# Patient Record
Sex: FEMALE | Race: WHITE | Employment: OTHER | URBAN - METROPOLITAN AREA
[De-identification: names, ages, dates, MRNs, and addresses within clinical notes are randomized per-mention and may not be internally consistent; named-entity substitution may affect disease eponyms.]

---

## 2017-05-01 ENCOUNTER — HOSPITAL ENCOUNTER (OUTPATIENT)
Dept: INTERVENTIONAL RADIOLOGY/VASCULAR | Age: 47
Discharge: HOME | End: 2017-05-01
Attending: ORTHOPAEDIC SURGERY | Admitting: ORTHOPAEDIC SURGERY
Payer: COMMERCIAL

## 2017-05-01 VITALS
SYSTOLIC BLOOD PRESSURE: 108 MMHG | BODY MASS INDEX: 25.61 KG/M2 | WEIGHT: 122 LBS | RESPIRATION RATE: 16 BRPM | TEMPERATURE: 98.4 F | OXYGEN SATURATION: 100 % | HEART RATE: 74 BPM | HEIGHT: 58 IN | DIASTOLIC BLOOD PRESSURE: 79 MMHG

## 2017-05-01 DIAGNOSIS — M25.551 RIGHT HIP PAIN: ICD-10-CM

## 2017-05-01 PROCEDURE — 77002 NEEDLE LOCALIZATION BY XRAY: CPT

## 2017-05-01 PROCEDURE — 74011636320 HC RX REV CODE- 636/320

## 2017-05-01 PROCEDURE — 74011000250 HC RX REV CODE- 250: Performed by: RADIOLOGY

## 2017-05-01 PROCEDURE — 74011250636 HC RX REV CODE- 250/636: Performed by: RADIOLOGY

## 2017-05-01 RX ORDER — BUPIVACAINE HYDROCHLORIDE 5 MG/ML
30 INJECTION, SOLUTION EPIDURAL; INTRACAUDAL
Status: COMPLETED | OUTPATIENT
Start: 2017-05-01 | End: 2017-05-01

## 2017-05-01 RX ORDER — TRIAMCINOLONE ACETONIDE 40 MG/ML
40 INJECTION, SUSPENSION INTRA-ARTICULAR; INTRAMUSCULAR
Status: COMPLETED | OUTPATIENT
Start: 2017-05-01 | End: 2017-05-01

## 2017-05-01 RX ADMIN — BUPIVACAINE HYDROCHLORIDE 150 MG: 5 INJECTION, SOLUTION EPIDURAL; INTRACAUDAL at 08:41

## 2017-05-01 RX ADMIN — TRIAMCINOLONE ACETONIDE 40 MG: 40 INJECTION, SUSPENSION INTRA-ARTICULAR; INTRAMUSCULAR at 08:41

## 2017-05-01 RX ADMIN — IOHEXOL 20 ML: 180 INJECTION INTRAVENOUS at 08:41

## 2017-08-15 ENCOUNTER — HOSPITAL ENCOUNTER (OUTPATIENT)
Dept: MAMMOGRAPHY | Age: 47
Discharge: HOME | End: 2017-08-15
Attending: OBSTETRICS & GYNECOLOGY
Payer: COMMERCIAL

## 2017-08-15 DIAGNOSIS — Z12.31 VISIT FOR SCREENING MAMMOGRAM: ICD-10-CM

## 2017-08-15 PROCEDURE — 77067 SCR MAMMO BI INCL CAD: CPT

## 2018-09-25 ENCOUNTER — HOSPITAL ENCOUNTER (OUTPATIENT)
Dept: MAMMOGRAPHY | Age: 48
Discharge: HOME | End: 2018-09-25
Payer: COMMERCIAL

## 2018-09-25 DIAGNOSIS — Z12.31 VISIT FOR SCREENING MAMMOGRAM: ICD-10-CM

## 2018-09-25 PROCEDURE — 77063 BREAST TOMOSYNTHESIS BI: CPT

## 2019-09-27 ENCOUNTER — HOSPITAL ENCOUNTER (OUTPATIENT)
Dept: MAMMOGRAPHY | Age: 49
Discharge: HOME | End: 2019-09-27
Attending: OBSTETRICS & GYNECOLOGY
Payer: COMMERCIAL

## 2019-09-27 DIAGNOSIS — Z12.39 BREAST SCREENING: ICD-10-CM

## 2019-09-27 PROCEDURE — 77063 BREAST TOMOSYNTHESIS BI: CPT

## 2020-09-30 ENCOUNTER — TRANSCRIBE ORDER (OUTPATIENT)
Dept: SCHEDULING | Age: 50
End: 2020-09-30

## 2020-09-30 DIAGNOSIS — Z12.31 ENCOUNTER FOR MAMMOGRAM TO ESTABLISH BASELINE MAMMOGRAM: Primary | ICD-10-CM

## 2020-10-23 ENCOUNTER — HOSPITAL ENCOUNTER (OUTPATIENT)
Dept: MAMMOGRAPHY | Age: 50
Discharge: HOME | End: 2020-10-23
Attending: OBSTETRICS & GYNECOLOGY
Payer: COMMERCIAL

## 2020-10-23 DIAGNOSIS — Z12.31 ENCOUNTER FOR MAMMOGRAM TO ESTABLISH BASELINE MAMMOGRAM: ICD-10-CM

## 2020-10-23 PROCEDURE — 77063 BREAST TOMOSYNTHESIS BI: CPT

## 2020-11-29 ENCOUNTER — HOSPITAL ENCOUNTER (OUTPATIENT)
Dept: PREADMISSION TESTING | Age: 50
Discharge: HOME | End: 2020-11-29
Payer: COMMERCIAL

## 2020-11-29 PROCEDURE — 87635 SARS-COV-2 COVID-19 AMP PRB: CPT

## 2020-11-30 LAB — SARS-COV-2, COV2NT: NOT DETECTED

## 2020-12-03 ENCOUNTER — ANESTHESIA (OUTPATIENT)
Dept: ENDOSCOPY | Age: 50
End: 2020-12-03
Payer: COMMERCIAL

## 2020-12-03 ENCOUNTER — ANESTHESIA EVENT (OUTPATIENT)
Dept: ENDOSCOPY | Age: 50
End: 2020-12-03
Payer: COMMERCIAL

## 2020-12-03 ENCOUNTER — HOSPITAL ENCOUNTER (OUTPATIENT)
Age: 50
Setting detail: OUTPATIENT SURGERY
Discharge: HOME | End: 2020-12-03
Attending: INTERNAL MEDICINE | Admitting: INTERNAL MEDICINE
Payer: COMMERCIAL

## 2020-12-03 VITALS
OXYGEN SATURATION: 100 % | SYSTOLIC BLOOD PRESSURE: 111 MMHG | RESPIRATION RATE: 18 BRPM | TEMPERATURE: 98.4 F | BODY MASS INDEX: 27.29 KG/M2 | HEART RATE: 75 BPM | HEIGHT: 58 IN | WEIGHT: 130 LBS | DIASTOLIC BLOOD PRESSURE: 68 MMHG

## 2020-12-03 PROCEDURE — 76060000032 HC ANESTHESIA 0.5 TO 1 HR: Performed by: INTERNAL MEDICINE

## 2020-12-03 PROCEDURE — 76040000007: Performed by: INTERNAL MEDICINE

## 2020-12-03 PROCEDURE — 74011000250 HC RX REV CODE- 250: Performed by: NURSE ANESTHETIST, CERTIFIED REGISTERED

## 2020-12-03 PROCEDURE — 74011250636 HC RX REV CODE- 250/636: Performed by: NURSE ANESTHETIST, CERTIFIED REGISTERED

## 2020-12-03 PROCEDURE — 74011250637 HC RX REV CODE- 250/637: Performed by: INTERNAL MEDICINE

## 2020-12-03 RX ORDER — DEXTROMETHORPHAN/PSEUDOEPHED 2.5-7.5/.8
1.2 DROPS ORAL
Status: DISCONTINUED | OUTPATIENT
Start: 2020-12-03 | End: 2020-12-03 | Stop reason: HOSPADM

## 2020-12-03 RX ORDER — SODIUM CHLORIDE 0.9 % (FLUSH) 0.9 %
5-40 SYRINGE (ML) INJECTION AS NEEDED
Status: DISCONTINUED | OUTPATIENT
Start: 2020-12-03 | End: 2020-12-03 | Stop reason: HOSPADM

## 2020-12-03 RX ORDER — NALOXONE HYDROCHLORIDE 0.4 MG/ML
0.4 INJECTION, SOLUTION INTRAMUSCULAR; INTRAVENOUS; SUBCUTANEOUS
Status: DISCONTINUED | OUTPATIENT
Start: 2020-12-03 | End: 2020-12-03 | Stop reason: HOSPADM

## 2020-12-03 RX ORDER — SODIUM CHLORIDE 9 MG/ML
INJECTION, SOLUTION INTRAVENOUS
Status: DISCONTINUED | OUTPATIENT
Start: 2020-12-03 | End: 2020-12-03 | Stop reason: HOSPADM

## 2020-12-03 RX ORDER — ATROPINE SULFATE 0.1 MG/ML
0.5 INJECTION INTRAVENOUS
Status: DISCONTINUED | OUTPATIENT
Start: 2020-12-03 | End: 2020-12-03 | Stop reason: HOSPADM

## 2020-12-03 RX ORDER — SODIUM CHLORIDE 0.9 % (FLUSH) 0.9 %
5-40 SYRINGE (ML) INJECTION EVERY 8 HOURS
Status: DISCONTINUED | OUTPATIENT
Start: 2020-12-03 | End: 2020-12-03 | Stop reason: HOSPADM

## 2020-12-03 RX ORDER — EPINEPHRINE 0.1 MG/ML
1 INJECTION INTRACARDIAC; INTRAVENOUS
Status: DISCONTINUED | OUTPATIENT
Start: 2020-12-03 | End: 2020-12-03 | Stop reason: HOSPADM

## 2020-12-03 RX ORDER — ONDANSETRON 2 MG/ML
INJECTION INTRAMUSCULAR; INTRAVENOUS AS NEEDED
Status: DISCONTINUED | OUTPATIENT
Start: 2020-12-03 | End: 2020-12-03 | Stop reason: HOSPADM

## 2020-12-03 RX ORDER — PROPOFOL 10 MG/ML
INJECTION, EMULSION INTRAVENOUS AS NEEDED
Status: DISCONTINUED | OUTPATIENT
Start: 2020-12-03 | End: 2020-12-03 | Stop reason: HOSPADM

## 2020-12-03 RX ORDER — SODIUM CHLORIDE 9 MG/ML
50 INJECTION, SOLUTION INTRAVENOUS CONTINUOUS
Status: DISCONTINUED | OUTPATIENT
Start: 2020-12-03 | End: 2020-12-03 | Stop reason: HOSPADM

## 2020-12-03 RX ORDER — LIDOCAINE HYDROCHLORIDE 20 MG/ML
INJECTION, SOLUTION EPIDURAL; INFILTRATION; INTRACAUDAL; PERINEURAL AS NEEDED
Status: DISCONTINUED | OUTPATIENT
Start: 2020-12-03 | End: 2020-12-03 | Stop reason: HOSPADM

## 2020-12-03 RX ORDER — FLUMAZENIL 0.1 MG/ML
0.2 INJECTION INTRAVENOUS
Status: DISCONTINUED | OUTPATIENT
Start: 2020-12-03 | End: 2020-12-03 | Stop reason: HOSPADM

## 2020-12-03 RX ORDER — MIDAZOLAM HYDROCHLORIDE 1 MG/ML
.25-5 INJECTION, SOLUTION INTRAMUSCULAR; INTRAVENOUS
Status: DISCONTINUED | OUTPATIENT
Start: 2020-12-03 | End: 2020-12-03 | Stop reason: HOSPADM

## 2020-12-03 RX ORDER — FENTANYL CITRATE 50 UG/ML
25-200 INJECTION, SOLUTION INTRAMUSCULAR; INTRAVENOUS
Status: DISCONTINUED | OUTPATIENT
Start: 2020-12-03 | End: 2020-12-03 | Stop reason: HOSPADM

## 2020-12-03 RX ADMIN — PROPOFOL 50 MG: 10 INJECTION, EMULSION INTRAVENOUS at 08:48

## 2020-12-03 RX ADMIN — PROPOFOL 50 MG: 10 INJECTION, EMULSION INTRAVENOUS at 08:52

## 2020-12-03 RX ADMIN — PROPOFOL 50 MG: 10 INJECTION, EMULSION INTRAVENOUS at 08:49

## 2020-12-03 RX ADMIN — LIDOCAINE HYDROCHLORIDE 40 MG: 20 INJECTION, SOLUTION EPIDURAL; INFILTRATION; INTRACAUDAL; PERINEURAL at 08:47

## 2020-12-03 RX ADMIN — PROPOFOL 50 MG: 10 INJECTION, EMULSION INTRAVENOUS at 08:55

## 2020-12-03 RX ADMIN — PROPOFOL 50 MG: 10 INJECTION, EMULSION INTRAVENOUS at 08:47

## 2020-12-03 RX ADMIN — SODIUM CHLORIDE: 900 INJECTION, SOLUTION INTRAVENOUS at 08:38

## 2020-12-03 RX ADMIN — ONDANSETRON HYDROCHLORIDE 4 MG: 2 INJECTION, SOLUTION INTRAMUSCULAR; INTRAVENOUS at 08:49

## 2020-12-03 RX ADMIN — PROPOFOL 50 MG: 10 INJECTION, EMULSION INTRAVENOUS at 08:58

## 2020-12-03 RX ADMIN — PROPOFOL 50 MG: 10 INJECTION, EMULSION INTRAVENOUS at 09:03

## 2020-12-03 RX ADMIN — PROPOFOL 50 MG: 10 INJECTION, EMULSION INTRAVENOUS at 09:10

## 2020-12-03 RX ADMIN — PROPOFOL 50 MG: 10 INJECTION, EMULSION INTRAVENOUS at 09:07

## 2020-12-03 NOTE — H&P
[address]  174 [facility], [address] [place], [place] [address]      History and Physical       NAME:  Meng Handler   :   1970   MRN:   [id number]             History of Present Illness:  Patient is a 45 y.o. who is seen for history of colon polyps. Last colonoscopy was in  and a hyperplastic polyp was removed. PMH:  Past Medical History:   Diagnosis Date    Heart murmur     CONGENITAL    Hypercholesterolemia     Nausea & vomiting     S/P colonoscopy 2010- hyperplastic rectal polyp       PSH:  Past Surgical History:   Procedure Laterality Date    BREAST SURGERY PROCEDURE UNLISTED      LEFT BREAST BIOPSY    HX BREAST BIOPSY Left     age 38; BENIGN    HX HEENT      ear tubes x 4    HX ORTHOPAEDIC  2015    RT FINGER REPAIR FROM DOG BITE    HX OTHER SURGICAL      Urethral sling for bladder incontinence       Allergies: Allergies   Allergen Reactions    Percocet [Oxycodone-Acetaminophen] Nausea and Vomiting    Percodan [Oxycodone Hcl-Oxycodone-Asa] Nausea and Vomiting    Sulfa (Sulfonamide Antibiotics) Hives       Home Medications:  Prior to Admission Medications   Prescriptions Last Dose Informant Patient Reported? Taking? cetirizine (ZYRTEC) 10 mg tablet Unknown at Unknown time  Yes No   Sig: Take 10 mg by mouth as needed for Allergies. fluticasone (FLONASE) 50 mcg/actuation nasal spray Unknown at Unknown time  Yes No   Si Sprays by Both Nostrils route daily. levonorgestrel (MIRENA) 20 mcg/24 hour (5 years) IUD   Yes No   Si Each by IntraUTERine route once. naproxen sodium 220 mg cap Unknown at Unknown time  Yes No   Sig: Take 220 mg by mouth as needed.       Facility-Administered Medications: None       Hospital Medications:  Current Facility-Administered Medications   Medication Dose Route Frequency    0.9% sodium chloride infusion  50 mL/hr IntraVENous CONTINUOUS    sodium chloride (NS) flush 5-40 mL  5-40 mL IntraVENous Q8H    sodium chloride (NS) flush 5-40 mL  5-40 mL IntraVENous PRN    midazolam (VERSED) injection 0.25-5 mg  0.25-5 mg IntraVENous Multiple    fentaNYL citrate (PF) injection  mcg   mcg IntraVENous Multiple    naloxone (NARCAN) injection 0.4 mg  0.4 mg IntraVENous Multiple    flumazeniL (ROMAZICON) 0.1 mg/mL injection 0.2 mg  0.2 mg IntraVENous Multiple    simethicone (MYLICON) 81YY/9.1FR oral drops 80 mg  1.2 mL Oral Multiple    atropine injection 0.5 mg  0.5 mg IntraVENous ONCE PRN    EPINEPHrine (ADRENALIN) 0.1 mg/mL syringe 1 mg  1 mg Endoscopically ONCE PRN       Social History:  Social History     Tobacco Use    Smoking status: Never Smoker    Smokeless tobacco: Never Used   Substance Use Topics    Alcohol use:  Yes     Alcohol/week: 5.0 standard drinks     Types: 5 Glasses of wine per week       Family History:  Family History   Problem Relation Age of Onset    Cancer Mother         small cell lung cancer    Other Mother         Graves disease    Hypertension Mother     Coronary Artery Disease Father         stents    High Cholesterol Father     Heart Surgery Father 39        CABG x3    Hypertension Father     Depression Father     Breast Cancer Paternal Aunt     Anesth Problems Neg Hx              Review of Systems:      Constitutional: negative fever, negative chills, negative weight loss  Eyes:   negative visual changes  ENT:   negative sore throat, tongue or lip swelling  Respiratory:  negative cough, negative dyspnea  Cards:  negative for chest pain, palpitations, lower extremity edema  GI:   See HPI  :  negative for frequency, dysuria  Integument:  negative for rash and pruritus  Heme:  negative for easy bruising and gum/nose bleeding  Musculoskel: negative for myalgias,  back pain and muscle weakness  Neuro: negative for headaches, dizziness, vertigo  Psych:  negative for feelings of anxiety, depression       Objective:     Patient Vitals for the past 8 hrs:   BP Temp Pulse Resp SpO2 Height Weight   12/03/20 0813 120/89 98.2 °F (36.8 °C) 80 18 100 % -- --   12/03/20 0806 -- -- -- -- -- 4' 10\" (1.473 m) 59 kg (130 lb)     No intake/output data recorded. No intake/output data recorded. EXAM:     NEURO-a&o   HEENT-wnl   LUNGS-clear    COR-regular rate and rhythym     ABD-soft , no tenderness, no rebound, good bs     EXT-no edema     Data Review     No results for input(s): WBC, HGB, HCT, PLT, HGBEXT, HCTEXT, PLTEXT in the last 72 hours. No results for input(s): NA, K, CL, CO2, BUN, CREA, GLU, PHOS, CA in the last 72 hours. No results for input(s): AP, TBIL, TP, ALB, GLOB, GGT, AML, LPSE in the last 72 hours. No lab exists for component: SGOT, GPT, AMYP, HLPSE  No results for input(s): INR, PTP, APTT, INREXT in the last 72 hours. Assessment:     · History of colon polyps     Patient Active Problem List   Diagnosis Code    Chest pain R07.9    Dyslipidemia E78.5    Murmur R01.1     Plan:   · The patient was counseled at length about the risks of ronald Covid-19 in the romelia-operative and post-operative states including the recovery window of their procedure. The patient was made aware that ronald Covid-19 after a surgical procedure may worsen their prognosis for recovering from the virus and lend to a higher morbidity and or mortality risk. The patient was given the options of postponing their procedure. All of the risks, benefits, and alternatives were discussed. The patient does wish to proceed with the procedure. · Endoscopic procedure with MAC     Signed By: Jenna River.  Arlene Mcdaniels MD     12/3/2020  8:40 AM

## 2020-12-03 NOTE — ANESTHESIA PREPROCEDURE EVALUATION
Relevant Problems   No relevant active problems       Anesthetic History     PONV          Review of Systems / Medical History  Patient summary reviewed, nursing notes reviewed and pertinent labs reviewed    Pulmonary  Within defined limits                 Neuro/Psych   Within defined limits           Cardiovascular              Hyperlipidemia         GI/Hepatic/Renal  Within defined limits              Endo/Other  Within defined limits           Other Findings              Physical Exam    Airway  Mallampati: II  TM Distance: > 6 cm  Neck ROM: normal range of motion   Mouth opening: Normal     Cardiovascular  Regular rate and rhythm,  S1 and S2 normal,  no murmur, click, rub, or gallop             Dental  No notable dental hx       Pulmonary  Breath sounds clear to auscultation               Abdominal  GI exam deferred       Other Findings            Anesthetic Plan    ASA: 1  Anesthesia type: MAC            Anesthetic plan and risks discussed with: Patient

## 2020-12-03 NOTE — ANESTHESIA POSTPROCEDURE EVALUATION
Post-Anesthesia Evaluation and Assessment    Patient: Ross Coffman MRN: [id number]  SSN: [id number]    YOB: 1970  Age: 45 y.o. Sex: female      I have evaluated the patient and they are stable and ready for discharge from the PACU. Cardiovascular Function/Vital Signs  Visit Vitals  /65   Pulse 80   Temp 36.8 °C (98.2 °F)   Resp 16   Ht 4' 10\" (1.473 m)   Wt 59 kg (130 lb)   SpO2 100%   BMI 27.17 kg/m²       Patient is status post MAC anesthesia for Procedure(s):  . COLONOSCOPY   :-.    Nausea/Vomiting: None    Postoperative hydration reviewed and adequate. Pain:  Pain Scale 1: Numeric (0 - 10) (12/03/20 0883)  Pain Intensity 1: 6 (12/03/20 5262)   Managed    Neurological Status: At baseline    Mental Status, Level of Consciousness: Alert and  oriented to person, place, and time    Pulmonary Status:   O2 Device: CO2 nasal cannula (12/03/20 0912)   Adequate oxygenation and airway patent    Complications related to anesthesia: None    Post-anesthesia assessment completed. No concerns    Signed By: Tata Cohn MD     December 3, 2020              Procedure(s):  . COLONOSCOPY   :-.    MAC    <BSHSIANPOST>    INITIAL Post-op Vital signs:   Vitals Value Taken Time   BP 94/40 12/3/2020  9:17 AM   Temp     Pulse 83 12/3/2020  9:19 AM   Resp 21 12/3/2020  9:19 AM   SpO2 98 % 12/3/2020  9:19 AM   Vitals shown include unvalidated device data.

## 2020-12-03 NOTE — ROUTINE PROCESS
Moraima Lindsay  1970  [id number]    Situation:  Verbal report received from: WILLEM [place]  Procedure: Procedure(s):  . COLONOSCOPY   :-    Background:    Preoperative diagnosis: HX OF POLYPS  Postoperative diagnosis: 1. Internal Hemorrhoids    :  Dr. Alana Valadez  Assistant(s): Endoscopy Technician-1: Minesh Ortiz  Endoscopy RN-1: Meghana Acevedo RN    Specimens: * No specimens in log *  H. Pylori  no    Assessment:  Intra-procedure medications       Anesthesia gave intra-procedure sedation and medications, see anesthesia flow sheet yes    Intravenous fluids: 800  NS @ KVO     Vital signs stable yes    Abdominal assessment: round and soft yes    Recommendation:  Discharge patient per MD order yes.   Return to floor no  Family or Friend :   Permission to share finding with family or friend yes

## 2020-12-03 NOTE — DISCHARGE INSTRUCTIONS
[address]  1500 [facility], [address] [place] [address]    COLON DISCHARGE INSTRUCTIONS    Ambrosio Gonzalez  [id number]  1970    Discomfort:  Redness at IV site- apply warm compress to area; if redness or soreness persist- contact your physician  There may be a slight amount of blood passed from the rectum  Gaseous discomfort- walking, belching will help relieve any discomfort  You may not operate a vehicle for 12 hours  You may not engage in an occupation involving machinery or appliances for rest of today  You may not drink alcoholic beverages for at least 12 hours  Avoid making any critical decisions for at least 24 hour  DIET:  You may resume your regular diet - however -  remember your colon is empty and a heavy meal will produce gas. Avoid these foods:  vegetables, fried / greasy foods, carbonated drinks     ACTIVITY:  You may  resume your normal daily activities it is recommended that you spend the remainder of the day resting -  avoid any strenuous activity. CALL M.D. ANY SIGN OF:   Increasing pain, nausea, vomiting  Abdominal distension (swelling)  New increased bleeding (oral or rectal)  Fever (chills)  Pain in chest area  Bloody discharge from nose or mouth  Shortness of breath      Follow-up Instructions:   Call Dr. Eleanor Bhatt for any questions or problems at [address]:   Your colonoscopy showed small internal hemorrhoids and was otherwise normal. Your next colonoscopy will be due in 10 years. Please maintain a high fiber diet. Telephone # [phone]      Signed By: Kavin Dakins.  Cecil Casey MD     12/3/2020  9:18 AM       DISCHARGE SUMMARY from Nurse    The following personal items collected during your admission are returned to you:   Dental Appliance: Dental Appliances: None  Vision: Visual Aid: Glasses  Hearing Aid:    Jewelry:    Clothing:    Other Valuables:    Valuables sent to safe:      Learning About Coronavirus (COVID-19)  Coronavirus (COVID-19): Overview  What is coronavirus (DBOWS-06)? The coronavirus disease (COVID-19) is caused by a virus. It is an illness that was first found in [place], [place], in December 2019. It has since spread worldwide. The virus can cause fever, cough, and trouble breathing. In severe cases, it can cause pneumonia and make it hard to breathe without help. It can cause death. Coronaviruses are a large group of viruses. They cause the common cold. They also cause more serious illnesses like Middle East respiratory syndrome (MERS) and severe acute respiratory syndrome (SARS). COVID-19 is caused by a novel coronavirus. That means it's a new type that has not been seen in people before. This virus spreads person-to-person through droplets from coughing and sneezing. It can also spread when you are close to someone who is infected. And it can spread when you touch something that has the virus on it, such as a doorknob or a tabletop. What can you do to protect yourself from coronavirus (COVID-19)? The best way to protect yourself from getting sick is to:  · Avoid areas where there is an outbreak. · Avoid contact with people who may be infected. · Wash your hands often with soap or alcohol-based hand sanitizers. · Avoid crowds and try to stay at least 6 feet away from other people. · Wash your hands often, especially after you cough or sneeze. Use soap and water, and scrub for at least 20 seconds. If soap and water aren't available, use an alcohol-based hand . · Avoid touching your mouth, nose, and eyes. What can you do to avoid spreading the virus to others? To help avoid spreading the virus to others:  · Cover your mouth with a tissue when you cough or sneeze. Then throw the tissue in the trash. · Use a disinfectant to clean things that you touch often. · Stay home if you are sick or have been exposed to the virus. Don't go to school, work, or public areas. And don't use public transportation.   · If you are sick:  ? Leave your home only if you need to get medical care. But call the doctor's office first so they know you're coming. And wear a face mask, if you have one.  ? If you have a face mask, wear it whenever you're around other people. It can help stop the spread of the virus when you cough or sneeze. ? Clean and disinfect your home every day. Use household  and disinfectant wipes or sprays. Take special care to clean things that you grab with your hands. These include doorknobs, remote controls, phones, and handles on your refrigerator and microwave. And don't forget countertops, tabletops, bathrooms, and computer keyboards. When to call for help  Call 911 anytime you think you may need emergency care. For example, call if:  · You have severe trouble breathing. (You can't talk at all.)  · You have constant chest pain or pressure. · You are severely dizzy or lightheaded. · You are confused or can't think clearly. · Your face and lips have a blue color. · You pass out (lose consciousness) or are very hard to wake up. Call your doctor now if you develop symptoms such as:  · Shortness of breath. · Fever. · Cough. If you need to get care, call ahead to the doctor's office for instructions before you go. Make sure you wear a face mask, if you have one, to prevent exposing other people to the virus. Where can you get the latest information? The following health organizations are tracking and studying this virus. Their websites contain the most up-to-date information. Chris Valentine also learn what to do if you think you may have been exposed to the virus. · U.S. Centers for Disease Control and Prevention (CDC): The CDC provides updated news about the disease and travel advice. The website also tells you how to prevent the spread of infection. www.cdc.gov  · World Health Organization [facility]): WHO offers information about the virus outbreaks.  WHO also has travel advice. www.who.int  Current as of: April 1, 2020               Content Version: 12.4  © 3835-3409 [employer]. Care instructions adapted under license by your healthcare professional. If you have questions about a medical condition or this instruction, always ask your healthcare professional. [address] any warranty or liability for your use of this information.

## 2020-12-03 NOTE — PROGRESS NOTES

## 2020-12-03 NOTE — PROCEDURES
[address]  [address]      Colonoscopy Operative Report    Lashell Nichols  [id number]  1970      Procedure Type:   Colonoscopy --screening     Indications:    Screening colonoscopy         Pre-operative Diagnosis: see indication above    Post-operative Diagnosis:  See findings below    :  Malik Santos MD      Referring Provider: Other, MD Christiana      Sedation:  MAC anesthesia Propofol      Procedure Details:  After informed consent was obtained with all risks and benefits of procedure explained and preoperative exam completed, the patient was taken to the endoscopy suite and placed in the left lateral decubitus position. Upon sequential sedation as per above, a digital rectal exam was performed demonstrating internal hemorrhoids. The Olympus pediatric videocolonoscope  was inserted in the rectum and carefully advanced to the sigmoid colon at which point a fixed, angulated turn was encountered and could not be traversed. The ultraslim pediatric colonoscope was used and traversed this region. The cecum was identified by the ileocecal valve and appendiceal orifice. The quality of preparation was excellent. The colonoscope was slowly withdrawn with careful evaluation between folds. Retroflexion in the rectum was completed . Findings:   Rectum: normal  Sigmoid: normal  Descending Colon: normal  Transverse Colon: normal  Ascending Colon: normal  Cecum: normal  Terminal Ileum: normal      Specimen Removed:  none    Complications: None. EBL:  None. Impression:    normal colonic mucosa throughout  hemorrhoids internal, Small in size    Recommendations:    - For colon cancer screening in this average-risk patient, colonoscopy may be repeated in 10 years. - High fiber diet. Signed By: Malik Santos MD     12/3/2020  9:19 AM

## 2020-12-03 NOTE — PROGRESS NOTES
D/C instructions completed with pt at bedside, MD at bedside to speak with pt. IV removed,  coming from main OR to  pt.

## 2021-09-16 ENCOUNTER — TRANSCRIBE ORDER (OUTPATIENT)
Dept: SCHEDULING | Age: 51
End: 2021-09-16

## 2021-09-16 DIAGNOSIS — Z12.31 SCREENING MAMMOGRAM, ENCOUNTER FOR: Primary | ICD-10-CM

## 2021-10-25 ENCOUNTER — HOSPITAL ENCOUNTER (OUTPATIENT)
Dept: MAMMOGRAPHY | Age: 51
Discharge: HOME | End: 2021-10-25
Attending: OBSTETRICS & GYNECOLOGY
Payer: COMMERCIAL

## 2021-10-25 DIAGNOSIS — Z12.31 SCREENING MAMMOGRAM, ENCOUNTER FOR: ICD-10-CM

## 2021-10-25 PROCEDURE — 77063 BREAST TOMOSYNTHESIS BI: CPT

## 2022-07-17 ENCOUNTER — APPOINTMENT (OUTPATIENT)
Dept: GENERAL RADIOLOGY | Age: 52
End: 2022-07-17
Attending: EMERGENCY MEDICINE
Payer: COMMERCIAL

## 2022-07-17 ENCOUNTER — APPOINTMENT (OUTPATIENT)
Dept: GENERAL RADIOLOGY | Age: 52
End: 2022-07-17
Attending: STUDENT IN AN ORGANIZED HEALTH CARE EDUCATION/TRAINING PROGRAM
Payer: COMMERCIAL

## 2022-07-17 ENCOUNTER — HOSPITAL ENCOUNTER (EMERGENCY)
Age: 52
Discharge: HOME | End: 2022-07-17
Attending: EMERGENCY MEDICINE | Admitting: STUDENT IN AN ORGANIZED HEALTH CARE EDUCATION/TRAINING PROGRAM
Payer: COMMERCIAL

## 2022-07-17 VITALS
HEART RATE: 85 BPM | SYSTOLIC BLOOD PRESSURE: 137 MMHG | HEIGHT: 58 IN | BODY MASS INDEX: 30.4 KG/M2 | OXYGEN SATURATION: 96 % | RESPIRATION RATE: 18 BRPM | DIASTOLIC BLOOD PRESSURE: 71 MMHG | TEMPERATURE: 98.6 F | WEIGHT: 144.84 LBS

## 2022-07-17 DIAGNOSIS — S52.602B TYPE I OR II OPEN FRACTURE OF DISTAL END OF LEFT ULNA, UNSPECIFIED FRACTURE MORPHOLOGY, INITIAL ENCOUNTER: ICD-10-CM

## 2022-07-17 DIAGNOSIS — S52.502B TYPE I OR II OPEN FRACTURE OF DISTAL END OF LEFT RADIUS, UNSPECIFIED FRACTURE MORPHOLOGY, INITIAL ENCOUNTER: Primary | ICD-10-CM

## 2022-07-17 PROCEDURE — 96374 THER/PROPH/DIAG INJ IV PUSH: CPT

## 2022-07-17 PROCEDURE — 73110 X-RAY EXAM OF WRIST: CPT

## 2022-07-17 PROCEDURE — 74011000250 HC RX REV CODE- 250: Performed by: STUDENT IN AN ORGANIZED HEALTH CARE EDUCATION/TRAINING PROGRAM

## 2022-07-17 PROCEDURE — 74011250636 HC RX REV CODE- 250/636: Performed by: EMERGENCY MEDICINE

## 2022-07-17 PROCEDURE — 74011250636 HC RX REV CODE- 250/636: Performed by: STUDENT IN AN ORGANIZED HEALTH CARE EDUCATION/TRAINING PROGRAM

## 2022-07-17 PROCEDURE — 74011250637 HC RX REV CODE- 250/637: Performed by: STUDENT IN AN ORGANIZED HEALTH CARE EDUCATION/TRAINING PROGRAM

## 2022-07-17 PROCEDURE — 75810000302 HC ER LEVEL 2 CLOSED TREATMNT FRACTURE/DISLOCATION

## 2022-07-17 PROCEDURE — 96375 TX/PRO/DX INJ NEW DRUG ADDON: CPT

## 2022-07-17 PROCEDURE — 96372 THER/PROPH/DIAG INJ SC/IM: CPT

## 2022-07-17 PROCEDURE — 99285 EMERGENCY DEPT VISIT HI MDM: CPT

## 2022-07-17 RX ORDER — CEPHALEXIN 500 MG/1
500 CAPSULE ORAL 4 TIMES DAILY
Qty: 28 CAPSULE | Refills: 0 | Status: SHIPPED | OUTPATIENT
Start: 2022-07-17 | End: 2022-07-24

## 2022-07-17 RX ORDER — PROPOFOL 10 MG/ML
100 INJECTION, EMULSION INTRAVENOUS
Status: COMPLETED | OUTPATIENT
Start: 2022-07-17 | End: 2022-07-17

## 2022-07-17 RX ORDER — CEPHALEXIN 500 MG/1
500 CAPSULE ORAL
Status: COMPLETED | OUTPATIENT
Start: 2022-07-17 | End: 2022-07-17

## 2022-07-17 RX ORDER — ACETAMINOPHEN AND CODEINE PHOSPHATE 300; 30 MG/1; MG/1
1 TABLET ORAL
Qty: 16 TABLET | Refills: 0 | Status: SHIPPED | OUTPATIENT
Start: 2022-07-17 | End: 2022-07-21

## 2022-07-17 RX ORDER — CEPHALEXIN 500 MG/1
500 CAPSULE ORAL 4 TIMES DAILY
Qty: 28 CAPSULE | Refills: 0 | Status: SHIPPED | OUTPATIENT
Start: 2022-07-17 | End: 2022-07-17 | Stop reason: SDUPTHER

## 2022-07-17 RX ORDER — HYDROMORPHONE HYDROCHLORIDE 1 MG/ML
1 INJECTION, SOLUTION INTRAMUSCULAR; INTRAVENOUS; SUBCUTANEOUS ONCE
Status: COMPLETED | OUTPATIENT
Start: 2022-07-17 | End: 2022-07-17

## 2022-07-17 RX ORDER — ONDANSETRON 2 MG/ML
4 INJECTION INTRAMUSCULAR; INTRAVENOUS ONCE
Status: COMPLETED | OUTPATIENT
Start: 2022-07-17 | End: 2022-07-17

## 2022-07-17 RX ADMIN — PROPOFOL 100 MG: 10 INJECTION, EMULSION INTRAVENOUS at 17:32

## 2022-07-17 RX ADMIN — WATER 1 G: 1 INJECTION INTRAMUSCULAR; INTRAVENOUS; SUBCUTANEOUS at 16:37

## 2022-07-17 RX ADMIN — HYDROMORPHONE HYDROCHLORIDE 1 MG: 1 INJECTION, SOLUTION INTRAMUSCULAR; INTRAVENOUS; SUBCUTANEOUS at 16:27

## 2022-07-17 RX ADMIN — ONDANSETRON 4 MG: 2 INJECTION INTRAMUSCULAR; INTRAVENOUS at 17:40

## 2022-07-17 RX ADMIN — CEPHALEXIN 500 MG: 500 CAPSULE ORAL at 19:22

## 2022-07-17 NOTE — ED NOTES
1755 80 mg propofol IVP administered by Dr. Lisette Becker. 1800 20 mg additional IVP propofol administered by Dr. Lisette Becker. Dr. Radu Giordano cleaned wound and dressed site. 1804 Sedation end. Dr. Radu Giordano, orthopedic MD, completed setting splint.

## 2022-07-17 NOTE — ED TRIAGE NOTES
Pt c/o LEFT wrist injury and deformity after falling from step stool.   Obvious deformity noted on arrival.

## 2022-07-17 NOTE — CONSULTS
ORTHO CONSULT NOTE    Subjective:     Date of Consultation:  July 17, 2022  Referring Physician: Dr. Aly Contreras is a 47 y.o. female who is being seen for left wrist fracture. Injury occurred just prior to arrival to the ER. She fell at her home outside while washing her car. She noticed bleeding and deformity of the left wrist. Xrays in the ER today demonstrate fracture dislocation of the left distal radius with associated distal ulnar fracture. She denies head trauma/LOC during injury. She denies any numbness/tingling in the hand. No other reported injuries. She is otherwise healthy and works locally as a geriatric NP. Her  works here at [address] [place] [address] [place] in the [address] [place] [address]. Orthopedic surgery has been consulted by Dr. Pham Dillard for evaluation and management of this left wrist injury. Patient Active Problem List    Diagnosis Date Noted    Type I or II open fracture of distal end of both radius and ulna of left upper extremity 07/17/2022    Chest pain 09/04/2014    Dyslipidemia 09/04/2014    Murmur 09/04/2014     Family History   Problem Relation Age of Onset    Cancer Mother         small cell lung cancer    Other Mother         Graves disease    Hypertension Mother     Coronary Art Dis Father         stents    High Cholesterol Father     Heart Surgery Father 39        CABG x3    Hypertension Father     Depression Father     Breast Cancer Paternal Aunt     Anesth Problems Neg Hx       Social History     Tobacco Use    Smoking status: Never Smoker    Smokeless tobacco: Never Used   Substance Use Topics    Alcohol use: Yes     Alcohol/week: 5.0 standard drinks     Types: 5 Glasses of wine per week     Past Medical History:   Diagnosis Date    Heart murmur     CONGENITAL    Hypercholesterolemia     Nausea & vomiting     S/P colonoscopy 2010,2020 2010- hyperplastic rectal polyp      Past Surgical History:   Procedure Laterality Date    COLONOSCOPY N/A 12/3/2020    . COLONOSCOPY   :- [facility] by Talmage Hashimoto., [facility] at [facility]OSCOPY    HX BREAST BIOPSY Left     age 38; BENIGN    HX HEENT      ear tubes x 4    HX ORTHOPAEDIC  12/2015    RT FINGER REPAIR FROM DOG BITE    HX OTHER SURGICAL      Urethral sling for bladder incontinence    [place] BREAST SURGERY PROCEDURE UNLISTED  2005    LEFT BREAST BIOPSY      Prior to Admission medications    Medication Sig Start Date End Date Taking? Authorizing Provider   cephALEXin (Keflex) 500 mg capsule Take 1 Capsule by mouth four (4) times daily for 7 days. 7/17/22 7/24/22 Yes Bebo Lisa MD   acetaminophen-codeine (Tylenol-Codeine #3) 300-30 mg per tablet Take 1 Tablet by mouth every six (6) hours as needed for Pain for up to 4 days. Max Daily Amount: 4 Tablets. 7/17/22 7/21/22 Yes Bebo Lisa MD   fluticasone (FLONASE) 50 mcg/actuation nasal spray 2 Sprays by Both Nostrils route daily. Provider, Historical   naproxen sodium 220 mg cap Take 220 mg by mouth as needed. Provider, Historical   cetirizine (ZYRTEC) 10 mg tablet Take 10 mg by mouth as needed for Allergies. Provider, Historical   levonorgestrel (MIRENA) 20 mcg/24 hour (5 years) IUD 1 Each by IntraUTERine route once. Provider, Historical     No current facility-administered medications for this encounter. Current Outpatient Medications   Medication Sig    cephALEXin (Keflex) 500 mg capsule Take 1 Capsule by mouth four (4) times daily for 7 days.  acetaminophen-codeine (Tylenol-Codeine #3) 300-30 mg per tablet Take 1 Tablet by mouth every six (6) hours as needed for Pain for up to 4 days. Max Daily Amount: 4 Tablets.  fluticasone (FLONASE) 50 mcg/actuation nasal spray 2 Sprays by Both Nostrils route daily.  naproxen sodium 220 mg cap Take 220 mg by mouth as needed.  cetirizine (ZYRTEC) 10 mg tablet Take 10 mg by mouth as needed for Allergies.  levonorgestrel (MIRENA) 20 mcg/24 hour (5 years) IUD 1 Each by IntraUTERine route once.       Allergies Allergen Reactions    Percocet [Oxycodone-Acetaminophen] Nausea and Vomiting    Percodan [Oxycodone Hcl-Oxycodone-Asa] Nausea and Vomiting    Sulfa (Sulfonamide Antibiotics) Hives        Review of Systems:  A comprehensive review of systems was negative except for that written in the HPI. Objective:     Patient Vitals for the past 8 hrs:   BP Temp Pulse Resp SpO2 Height Weight   22 1859 -- -- 85 18 96 % -- --   22 1845 137/71 98.6 °F (37 °C) 76 19 97 % -- --   22 1830 (!) 151/72 -- 83 14 -- -- --   22 1815 (!) 153/85 -- 95 27 -- -- --   22 1805 (!) 140/86 98.2 °F (36.8 °C) 88 16 98 % -- --   22 1800 (!) 158/92 -- 78 24 98 % -- --   22 1756 135/74 -- 78 12 94 % -- --   22 1755 135/74 -- 80 21 95 % -- --   22 1750 (!) 147/76 -- 91 18 96 % -- --   22 1736 (!) 140/79 -- 86 20 93 % -- --   22 1705 (!) 147/80 98.6 °F (37 °C) 90 18 98 % -- --   22 1704 -- -- -- -- -- 4' 10\" (1.473 m) 65.7 kg (144 lb 13.5 oz)   22 1515 (!) 146/70 98 °F (36.7 °C) (!) 106 20 100 % -- --     Temp (24hrs), Av.4 °F (36.9 °C), Min:98 °F (36.7 °C), Max:98.6 °F (37 °C)      PHYSICAL EXAM:   General: 49yo female, alert, cooperative, complaining of severe left wrist pain, appears stated age,  at bedside  CNS: alert/oriented, normal mood  Skin: diffuse swelling/bruising of left wrist and hand, <1cm wound on the volar radial aspect of the wrist at the level of the radius fracture dislocation, wound actively bleeding  Extremities: obvious deformity of the left wrist with bleeding from the volar side, global TTP as expected, unable to tolerate any wrist motion secondary to pain  Vascular: capillary refill <2 secs in left hand  Neurologic: moving all fingers but with pain, sensation intact to light touch in radial/median/ulnar nerve distributions     Imaging Review:   XR WRIST LT AP/LAT/OBL MIN 3V 2022 15:23  INDICATION: trauma.   COMPARISON: None.  FINDINGS: Three views of the left wrist demonstrate acute comminuted  intra-articular distal radius fracture with significant dorsal displacement and  volar apex angulation. Possible acute nondisplaced ulnar styloid fracture. IMPRESSION  Acute, displaced, angulated intra-articular distal radius fracture. Possible acute nondisplaced ulnar styloid fracture. XR WRIST LT AP/LAT/OBL MIN 3V 7/17/2022 18:19  INDICATION: post-reduction. COMPARISON: Earlier today. FINDINGS: 2 views of the left wrist demonstrate reduction of the distal radial  fracture in near anatomic alignment. .  IMPRESSION  Near anatomic alignment of the distal radial fracture after  reduction. .      Impression:     Patient Active Problem List    Diagnosis Date Noted    Type I or II open fracture of distal end of both radius and ulna of left upper extremity 07/17/2022    Chest pain 09/04/2014    Dyslipidemia 09/04/2014    Murmur 09/04/2014     Principal Problem:    Type I or II open fracture of distal end of both radius and ulna of left upper extremity (7/17/2022)        Plan:   Discussed with attending orthopedic surgeon, Dr. Tejas Ramirez. Explained to patient and her  that this appears to be an open fracture that will need to be watched closely for infection. She has already received 2g of Ancef in the ER. We discussed taking her to the OR for formal I&D and reduction. Because the wound is <1cm, we ultimately elected to irrigate the wound and reduce in the ER. The left wrist was reduced without immediate complication (see procedure note). Reduction alignment was judged to be satisfactory following post-reduction xrays. She was splinted and sent home with instructions to return immediately if she develops signs or symptoms of infection. She was provided with a prescription for Keflex.  Recommend strict NWB on left upper extremity and regular ice and elevation of the wrist. She will follow-up with Dr. Thedford Gottron (patient preference) in the next few days.      Ayaan Arroyo [facility] 38  7/17/22

## 2022-07-17 NOTE — PROCEDURES
Fracture Reduction Procedural Note      Preprocedural Diagnosis: Type I or II open fracture of distal end of both radius and ulna of left upper extremity   Postprocedural Diagnosis: Type I or II open fracture of distal end of both radius and ulna of left upper extremity      Provider: ANGELIC Nash     Anesthesia: Conscious sedation administered by Dr. Christine Shaw    Allergy:   Allergies   Allergen Reactions    Percocet [Oxycodone-Acetaminophen] Nausea and Vomiting    Percodan [Oxycodone Hcl-Oxycodone-Asa] Nausea and Vomiting    Sulfa (Sulfonamide Antibiotics) Hives       Procedure Details: The risks,benefits and alternatives of a fracture reduction were explained and consent was obtained for the procedure. A small 1cm puncture wound was noted over the volar radial aspect of the wrist at the level of the fracture. Wound was actively bleeding and presumed to be caused by the fracture. Wound was cleansed and irrigated with sterile saline. A closed reduction of left wrist was then performed using traction and countertraction, reproduction of the injury mechanism, and then correction of malalignment. Patient tolerated procedure well w/o complications. Sugar-tong splint placed. Pt. And Family educated on neurovascular compromise and compartment syndrome. Pt. Neurovascularly intact with sensation intact and capillary refill <2secs p procedure in left hand. Pt. Awake and alert. Complications:  None; patient tolerated the procedure well.       Signed By: Chad Peralta, [address] [employer]

## 2022-07-17 NOTE — ED PROVIDER NOTES
16-year-old female with history of HLD presents to the ED with chief complaint of left wrist injury sustained an hour ago. Patient fell from sitting position onto outstretched left arm, had immediate pain and deformity. Pain is severe and constant, no treatment at home, reports associated wrist swelling. Did not hit her head or lose consciousness or sustain any other injury. She was feeling her normal self prior to this. Not on any blood thinners. No numbness or weakness. She does report a wound to the wrist associated with this injury, no active bleeding. The history is provided by the patient. Wrist Injury   Pertinent negatives include no numbness, no back pain and no neck pain. Past Medical History:   Diagnosis Date    Heart murmur     CONGENITAL    Hypercholesterolemia     Nausea & vomiting     S/P colonoscopy 2010,2020 2010- hyperplastic rectal polyp       Past Surgical History:   Procedure Laterality Date    COLONOSCOPY N/A 12/3/2020    . COLONOSCOPY   :- [facility] by Dany Apley., [facility] ENDOSCOPY    HX BREAST BIOPSY Left     age 38; BENIGN    HX HEENT      ear tubes x 4    HX ORTHOPAEDIC  12/2015    RT FINGER REPAIR FROM DOG BITE    HX OTHER SURGICAL      Urethral sling for bladder incontinence    [place] BREAST SURGERY PROCEDURE UNLISTED  2005    LEFT BREAST BIOPSY         Family History:   Problem Relation Age of Onset    Cancer Mother         small cell lung cancer    Other Mother         Graves disease    Hypertension Mother     Coronary Art Dis Father         stents    High Cholesterol Father     Heart Surgery Father 39        CABG x3    Hypertension Father     Depression Father     Breast Cancer Paternal Aunt     Anesth Problems Neg Hx        Social History     Socioeconomic History    Marital status:      Spouse name: Not on file    Number of children: Not on file    Years of education: Not on file    Highest education level: Not on file Occupational History    Not on file   Tobacco Use    Smoking status: Never Smoker    Smokeless tobacco: Never Used   Substance and Sexual Activity    Alcohol use: Yes     Alcohol/week: 5.0 standard drinks     Types: 5 Glasses of wine per week    Drug use: No    Sexual activity: Not on file   Other Topics Concern    Not on file   Social History Narrative    Not on file     Social Determinants of Health     Financial Resource Strain:     Difficulty of Paying Living Expenses: Not on file   Food Insecurity:     Worried About Running Out of Food in the Last Year: Not on file    Tabitha of Food in the Last Year: Not on file   Transportation Needs:     Lack of Transportation (Medical): Not on file    Lack of Transportation (Non-Medical): Not on file   Physical Activity:     Days of Exercise per Week: Not on file    Minutes of Exercise per Session: Not on file   Stress:     Feeling of Stress : Not on file   Social Connections:     Frequency of Communication with Friends and Family: Not on file    Frequency of Social Gatherings with Friends and Family: Not on file    Attends Latter-day Services: Not on file    Active Member of "[employer] Group or Organizations: Not on file    Attends Club or Organization Meetings: Not on file    Marital Status: Not on file   Intimate Partner Violence:     Fear of Current or Ex-Partner: Not on file    Emotionally Abused: Not on file    Physically Abused: Not on file    Sexually Abused: Not on file   Housing Stability:     Unable to Pay for Housing in the Last Year: Not on file    Number of [place] in the Last Year: Not on file    Unstable Housing in the Last Year: Not on file         ALLERGIES: Percocet [oxycodone-acetaminophen], Percodan [oxycodone hcl-oxycodone-asa], and Sulfa (sulfonamide antibiotics)    Review of Systems   Constitutional: Negative for chills and fever. HENT: Negative for congestion and rhinorrhea.     Respiratory: Negative for cough and shortness of breath. Cardiovascular: Negative for chest pain and leg swelling. Gastrointestinal: Negative for abdominal pain, constipation, diarrhea, nausea and vomiting. Genitourinary: Negative for difficulty urinating, dysuria and hematuria. Musculoskeletal: Positive for arthralgias and joint swelling. Negative for back pain and neck pain. Skin: Positive for wound. Negative for color change and rash. Neurological: Negative for dizziness, weakness, light-headedness, numbness and headaches. Psychiatric/Behavioral: Negative for agitation and confusion. Vitals:    07/17/22 1515   BP: (!) 146/70   Pulse: (!) 106   Resp: 20   Temp: 98 °F (36.7 °C)   SpO2: 100%            Physical Exam  Constitutional:       General: She is not in acute distress. Appearance: She is well-developed. HENT:      Head: Normocephalic and atraumatic. Eyes:      General: No scleral icterus. Pupils: Pupils are equal, round, and reactive to light. Neck:      Trachea: No tracheal deviation. Cardiovascular:      Rate and Rhythm: Normal rate and regular rhythm. Heart sounds: No murmur heard. No friction rub. No gallop. Pulmonary:      Effort: Pulmonary effort is normal. No respiratory distress. Breath sounds: Normal breath sounds. No wheezing or rales. Abdominal:      General: Bowel sounds are normal. There is no distension. Palpations: Abdomen is soft. Tenderness: There is no abdominal tenderness. Musculoskeletal:         General: No deformity. Cervical back: Neck supple. Comments: Left wrist with deformity, tenderness to palpation throughout, significant pain with any range of motion. Wound noted to ventral aspect of wrist without any active bleeding, appears linear, unable to examine further due to deformity of wrist and severe pain. Neurovascularly intact distally with good cap refill and good sensation. Skin:     General: Skin is warm and dry.    Neurological:      Mental Status: She is alert and oriented to person, place, and time. Psychiatric:         Behavior: Behavior normal.          MDM  Number of Diagnoses or Management Options  Diagnosis management comments: 78-year-old female presenting to the ED with left wrist injury. Closed fracture versus dislocation versus open fracture. Given inability to evaluate wound further, treated as open fracture with Ancef. X-ray revealed fractures of the distal radius and ulna. Ortho consulted and will reduce at bedside under sedation. Perfect Serve Consult  4:24 PM    ED Room Number: ER30/30  Patient Name and age:  Susan Denny 47 y.o.  female  Working Diagnosis: Open L distal radius and ulna fracture. [address] with acute, displaced, angulated intra-articular distal radius fracture and possible acute nondisplaced ulnar styloid fracture. Wound to wrist, hard to examine due to deformity and pain, giving Ancef. Procedures    CONSULT NOTE:   4:45 PM  Spoke with Dr. Dieter Merrill from orthopedics  Discussed pt's hx, disposition, and available diagnostic and imaging results. Reviewed care plans. Consultant agrees with plans as outlined. Will reduce at bedside undersedation. [facility]     Name: Susan Denny  Date:   7/17/2022    Procedure Details:    Immediately prior to the procedure, the patient was reevaluated and found suitable for the planned procedure and any planned medications. Immediately prior to the procedure a time out was called to verify the correct patient, procedure, equipment, staff, and marking as appropriate. Procedural sedation has been advised for this patient. The risks, benefits, and alternatives have been explained to the patient and She consents to the sedation. The patient last ate 2 hours ago. The ASA score is ASA 1 - Normal, healthy patient. The patient is having all vital signs monitored by an attending RN as well as pulse oximetry and end tidal C02 monitoring.     Mallampati Score Reference: The patient has a patent airway  With a Mallampati Classification Score of I (soft palate, uvula, fauces, tonsillar pillars visible). The patient was sedated with propofol/DIPRIVAN and no analgesic was given. Reversal agents and resuscitation equipment were at the bedside. The wrist reduction was done and the patient tolerated the procedure well with no complications. Reversal agents were not used. My time at the bedside was 14 minutes - start of sedation at 1750 and end of sedation at 1804 -  and the effects of the sedation are wearing off. The patient is being observed in the ED until She returns to baseline. Signed By: Radha Torres MD     6:29 PM  Orthopedics at bedside performed reduction, washout of wrist wound, and splinting without complication under sedation. She is going to follow-up with Dr. Wood Birch, return precautions given for any evidence of infection. DISCHARGE NOTE:  8:31 PM  The patient has been re-evaluated and feeling much better and are stable for discharge. All available radiology and laboratory results have been reviewed with patient and/or available family. Patient and/or family verbally conveyed their understanding and agreement of the patient's signs, symptoms, diagnosis, treatment and prognosis and additionally agree to follow-up as recommended in the discharge instructions or to return to the Emergency Department should their condition change or worsen prior to their follow-up appointment. All questions have been answered and patient and/or available family express understanding. LABORATORY RESULTS:  No results found for this or any previous visit (from the past 24 hour(s)). IMAGING RESULTS:  XR WRIST LT AP/LAT/OBL MIN 3V    Result Date: 7/17/2022  Near anatomic alignment of the distal radial fracture after reduction. .    XR WRIST LT AP/LAT/OBL MIN 3V    Result Date: 7/17/2022  Acute, displaced, angulated intra-articular distal radius fracture. Possible acute nondisplaced ulnar styloid fracture. MEDICATIONS GIVEN:  Medications   HYDROmorphone (DILAUDID) injection 1 mg (1 mg IntraMUSCular Given 7/17/22 1627)   ceFAZolin (ANCEF) 1 g in sterile water (preservative free) 10 mL IV syringe (1 g IntraVENous Given 7/17/22 1637)   propofoL (DIPRIVAN) 10 mg/mL injection 100 mg (100 mg IntraVENous Given by Provider 7/17/22 1732)   ondansetron (ZOFRAN) injection 4 mg (4 mg IntraVENous Given 7/17/22 1740)   cephALEXin (KEFLEX) capsule 500 mg (500 mg Oral Given 7/17/22 1922)       IMPRESSION:  1. Type I or II open fracture of distal end of left radius, unspecified fracture morphology, initial encounter    2. Type I or II open fracture of distal end of left ulna, unspecified fracture morphology, initial encounter        PLAN:  Follow-up Information     Follow up With Specialties Details Why Contact Info    Pedrito Tena MD Hand Surgery Physician, Orthopedic Surgery   [place] [id number]           Discharge Medication List as of 7/17/2022  6:58 PM      START taking these medications    Details   acetaminophen-codeine (Tylenol-Codeine #3) 300-30 mg per tablet Take 1 Tablet by mouth every six (6) hours as needed for Pain for up to 4 days. Max Daily Amount: 4 Tablets., Normal, Disp-16 Tablet, R-0      cephALEXin (Keflex) 500 mg capsule Take 1 Capsule by mouth four (4) times daily for 7 days. , Normal, Disp-28 Capsule, R-0         CONTINUE these medications which have NOT CHANGED    Details   fluticasone (FLONASE) 50 mcg/actuation nasal spray 2 Sprays by Both Nostrils route daily. , Historical Med      naproxen sodium 220 mg cap Take 220 mg by mouth as needed., Historical Med      cetirizine (ZYRTEC) 10 mg tablet Take 10 mg by mouth as needed for Allergies. , Historical Med      levonorgestrel (MIRENA) 20 mcg/24 hour (5 years) IUD 1 Each by IntraUTERine route once., Historical Med             Signed By: Kala Evans MD July 17, 2022

## 2022-07-18 ENCOUNTER — OFFICE VISIT (OUTPATIENT)
Dept: ORTHOPEDIC SURGERY | Age: 52
End: 2022-07-18
Payer: COMMERCIAL

## 2022-07-18 VITALS — BODY MASS INDEX: 28.34 KG/M2 | WEIGHT: 135 LBS | HEIGHT: 58 IN

## 2022-07-18 DIAGNOSIS — M79.641 RIGHT HAND PAIN: ICD-10-CM

## 2022-07-18 DIAGNOSIS — S52.532B TYPE I OR II OPEN COLLES' FRACTURE OF LEFT RADIUS, INITIAL ENCOUNTER: ICD-10-CM

## 2022-07-18 DIAGNOSIS — S60.041A CONTUSION OF RIGHT RING FINGER WITHOUT DAMAGE TO NAIL, INITIAL ENCOUNTER: Primary | ICD-10-CM

## 2022-07-18 DIAGNOSIS — M25.532 LEFT WRIST PAIN: ICD-10-CM

## 2022-07-18 DIAGNOSIS — S52.532B TYPE I OR II OPEN COLLES' FRACTURE OF LEFT RADIUS, INITIAL ENCOUNTER: Primary | ICD-10-CM

## 2022-07-18 DIAGNOSIS — S60.041A CONTUSION OF RIGHT RING FINGER WITHOUT DAMAGE TO NAIL, INITIAL ENCOUNTER: ICD-10-CM

## 2022-07-18 PROCEDURE — 99203 OFFICE O/P NEW LOW 30 MIN: CPT | Performed by: ORTHOPAEDIC SURGERY

## 2022-07-18 RX ORDER — ACETAMINOPHEN AND CODEINE PHOSPHATE 300; 30 MG/1; MG/1
1 TABLET ORAL
Qty: 15 TABLET | Refills: 0 | Status: SHIPPED | OUTPATIENT
Start: 2022-07-18 | End: 2022-07-21

## 2022-07-18 NOTE — PROGRESS NOTES
HPI: Susan Denny (: 1970) is a 47 y.o. female, patient, here for evaluation of the following chief complaint(s):    Wrist Pain (Was washing car 22 when the platform she was standing on gave out and she fell to her outstretched left hand. Went to [facility] where Luanne Yvette were taken and placed into a splint. Right hand dominant.)  Patient is seen today to evaluate her left wrist.  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 She was taken to [facility]. She had a small puncture wound and a displaced distal radius Colles' type fracture. She was treated with prompt closed reduction of the fracture with irrigation and dressing application of the open wound. This was followed by splint application. She has been placed on oral antibiotic therapy. She denied any problems previously with the left wrist and is seen for further treatment. Vitals:  Ht 4' 10\" (1.473 m)   Wt 135 lb (61.2 kg)   BMI 28.22 kg/m²    Body mass index is 28.22 kg/m². Allergies   Allergen Reactions    Percocet [Oxycodone-Acetaminophen] Nausea and Vomiting    Percodan [Oxycodone Hcl-Oxycodone-Asa] Nausea and Vomiting    Sulfa (Sulfonamide Antibiotics) Hives       Current Outpatient Medications   Medication Sig    acetaminophen-codeine (Tylenol-Codeine #3) 300-30 mg per tablet Take 1 Tablet by mouth every six (6) hours as needed for Pain for up to 4 days. Max Daily Amount: 4 Tablets.  cephALEXin (Keflex) 500 mg capsule Take 1 Capsule by mouth four (4) times daily for 7 days.  fluticasone (FLONASE) 50 mcg/actuation nasal spray 2 Sprays by Both Nostrils route daily.  naproxen sodium 220 mg cap Take 220 mg by mouth as needed.  cetirizine (ZYRTEC) 10 mg tablet Take 10 mg by mouth as needed for Allergies.     levonorgestrel (MIRENA) 20 mcg/24 hour (5 years) IUD 1 Each by IntraUTERine route once.     No current facility-administered medications for this visit. Past Medical History:   Diagnosis Date    Heart murmur     CONGENITAL    Hypercholesterolemia     Nausea & vomiting     S/P colonoscopy 2010,2020 2010- hyperplastic rectal polyp        Past Surgical History:   Procedure Laterality Date    COLONOSCOPY N/A 12/3/2020    . COLONOSCOPY   :- [facility]rael [facility]    HX BREAST BIOPSY Left     age 38; BENIGN    HX HEENT      ear tubes x 4    HX ORTHOPAEDIC  12/2015    RT FINGER REPAIR FROM DOG BITE    HX OTHER SURGICAL      Urethral sling for bladder incontinence    [place] BREAST SURGERY PROCEDURE UNLISTED  2005    LEFT BREAST BIOPSY       Family History   Problem Relation Age of Onset    Cancer Mother         small cell lung cancer    Other Mother         Graves disease    Hypertension Mother     Coronary Art Dis Father         stents    High Cholesterol Father     Heart Surgery Father 39        CABG x3    Hypertension Father     Depression Father     Breast Cancer Paternal Aunt     Anesth Problems Neg Hx         Social History     Tobacco Use    Smoking status: Never Smoker    Smokeless tobacco: Never Used   Substance Use Topics    Alcohol use: Yes     Alcohol/week: 5.0 standard drinks     Types: 5 Glasses of wine per week    Drug use: No        Review of Systems   All other systems reviewed and are negative. Physical Exam    Patient's left wrist is immobilized in a sugar-tong splint. Fingers are pink with good sensation refill with appropriate soft tissue swelling and discomfort. No elbow pain but appropriate wrist pain. Right ring finger had ecchymosis. The nail is intact at the distal tip and she is tender but fortunately x-ray showed no evidence of a fracture. Imaging:    XR Results (maximum last 3):   Results from Appointment encounter on 07/18/22    XR 4TH FINGER RT MIN 2 V    Narrative  AP, lateral and oblique x-ray of the right ring finger shows normal osseous and joint space findings no evidence of fracture arthritis. Results from [place] encounter on 07/17/22    XR WRIST LT AP/LAT/OBL MIN 3V    Narrative  EXAM: XR WRIST LT AP/LAT/OBL MIN 3V    INDICATION: post-reduction. COMPARISON: Earlier today. FINDINGS: 2 views of the left wrist demonstrate reduction of the distal radial  fracture in near anatomic alignment. .    Impression  Near anatomic alignment of the distal radial fracture after  reduction. .      XR WRIST LT AP/LAT/OBL MIN 3V    Narrative  EXAM: XR WRIST LT AP/LAT/OBL MIN 3V    INDICATION: trauma. COMPARISON: None. FINDINGS: Three views of the left wrist demonstrate acute comminuted  intra-articular distal radius fracture with significant dorsal displacement and  volar apex angulation. Possible acute nondisplaced ulnar styloid fracture. Impression  Acute, displaced, angulated intra-articular distal radius fracture. Possible acute nondisplaced ulnar styloid fracture. ASSESSMENT/PLAN:  Below is the assessment and plan developed based on review of pertinent history, physical exam, labs, studies, and medications. Patient fell and injured her left wrist washing her car when she fell off a 2 foot platform ladder on 7/17/2022. She sustained a grade 1 open left distal radius Colles' type fracture. The wound was treated in the [facility] ER with irrigation, dressing application. The fracture was treated as well in the ER with a closed manipulative reduction and splint application. He also sustained a contusion to the tip of the right ring finger with some ecchymosis and tenderness but x-rays show no evidence of a fracture. I reviewed the treatment options with the patient and answered her questions. I recommended prompt surgical management with planned irrigation and debridement of the puncture wound and volar fixed angle plate fixation of the distal radius fracture.   I reviewed risks that include but not limited to stiffness, pain, nerve or tendon damage, nonunion, malunion the possible need for delayed hardware removal.  Arrangements can be made for this to be performed on an outpatient basis soon as possible. 1. Type I or II open Colles' fracture of left radius, initial encounter  -     REFERRAL TO SURGERY  2. Left wrist pain  3. Right hand pain  -     XR 4TH FINGER RT MIN 2 V; Future  4. Contusion of right ring finger without damage to nail, initial encounter      No follow-ups on file. An electronic signature was used to authenticate this note.   -- Steve Maldonado MD

## 2022-07-21 NOTE — PROGRESS NOTES
HPI: Jamal Luna (: 1970) is a 47 y.o. female, patient, here for evaluation of the following chief complaint(s):    Surgical Follow-up (ORIF left wrist distal radius fracture with irrigation and debridement on 22.)  Patient is seen today to evaluate her left wrist.  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 She was taken to [facility] ER. She had a small puncture wound and a displaced distal radius Colles' type fracture. She was treated with prompt closed reduction of the fracture with irrigation and dressing application of the open wound. This was followed by splint application. She has been placed on oral antibiotic therapy. She denied any problems previously with the left wrist.  She underwent irrigation and debridement ORIF of the open left distal radius fracture volar fixed angle plating on 2022. She has a non-displaced ulnar styloid base fracture treated closed with no DRUJ instability. Vitals: There were no vitals taken for this visit. There is no height or weight on file to calculate BMI. Allergies   Allergen Reactions    Percocet [Oxycodone-Acetaminophen] Nausea and Vomiting    Percodan [Oxycodone Hcl-Oxycodone-Asa] Nausea and Vomiting    Sulfa (Sulfonamide Antibiotics) Hives       Current Outpatient Medications   Medication Sig    cephALEXin (Keflex) 500 mg capsule Take 1 Capsule by mouth four (4) times daily for 7 days. fluticasone (FLONASE) 50 mcg/actuation nasal spray 2 Sprays by Both Nostrils route daily. naproxen sodium 220 mg cap Take 220 mg by mouth as needed. cetirizine (ZYRTEC) 10 mg tablet Take 10 mg by mouth as needed for Allergies. levonorgestrel (MIRENA) 20 mcg/24 hour (5 years) IUD 1 Each by IntraUTERine route once. No current facility-administered medications for this visit.        Past Medical History: Diagnosis Date    Heart murmur     CONGENITAL    Hypercholesterolemia     Nausea & vomiting     S/P colonoscopy 2010,2020 2010- hyperplastic rectal polyp        Past Surgical History:   Procedure Laterality Date    COLONOSCOPY N/A 12/3/2020    . COLONOSCOPY   :- performed by Marialuisa Rao MD at [facility] ENDOSCOPY    HX BREAST BIOPSY Left     age 38; BENIGN    HX HEENT      ear tubes x 4    HX ORTHOPAEDIC  12/2015    RT FINGER REPAIR FROM DOG BITE    HX OTHER SURGICAL      Urethral sling for bladder incontinence    [place] BREAST SURGERY PROCEDURE UNLISTED  2005    LEFT BREAST BIOPSY       Family History   Problem Relation Age of Onset    Cancer Mother         small cell lung cancer    Other Mother         Graves disease    Hypertension Mother     Coronary Art Dis Father         stents    High Cholesterol Father     Heart Surgery Father 39        CABG x3    Hypertension Father     Depression Father     Breast Cancer Paternal Aunt     Anesth Problems Neg Hx         Social History     Tobacco Use    Smoking status: Never    Smokeless tobacco: Never   Substance Use Topics    Alcohol use: Yes     Alcohol/week: 5.0 standard drinks     Types: 5 Glasses of wine per week    Drug use: No        Review of Systems   All other systems reviewed and are negative. ROS    Positive for: Musculoskeletal  Last edited by Price Bull on 7/22/2022  8:35 AM.             Physical Exam    Patient's left wrist was taken out of her postoperative splint. Her wound is healing well at this stage there is really no redness drainage or signs of infection. Now working on gentle motion recovery. Imaging:    XR Results (maximum last 3): Results from Appointment encounter on 07/18/22    XR 4TH FINGER RT MIN 2 V    Narrative  AP, lateral and oblique x-ray of the right ring finger shows normal osseous and joint space findings no evidence of fracture arthritis.       Results from [place] encounter on 07/17/22    XR WRIST LT AP/LAT/OBL MIN 3V    Narrative  EXAM: XR WRIST LT AP/LAT/OBL MIN 3V    INDICATION: post-reduction. COMPARISON: Earlier today. FINDINGS: 2 views of the left wrist demonstrate reduction of the distal radial  fracture in near anatomic alignment. .    Impression  Near anatomic alignment of the distal radial fracture after  reduction. .      XR WRIST LT AP/LAT/OBL MIN 3V    Narrative  EXAM: XR WRIST LT AP/LAT/OBL MIN 3V    INDICATION: trauma. COMPARISON: None. FINDINGS: Three views of the left wrist demonstrate acute comminuted  intra-articular distal radius fracture with significant dorsal displacement and  volar apex angulation. Possible acute nondisplaced ulnar styloid fracture. Impression  Acute, displaced, angulated intra-articular distal radius fracture. Possible acute nondisplaced ulnar styloid fracture. XR Results (most recent):  Results from Appointment encounter on 07/22/22    XR WRIST LT AP/LAT/OBL MIN 3V    Narrative  AP, lateral and oblique x-ray of the left wrist shows a healing extra-articular distal radius fracture with volar fixed angle plate and screw fixation good position and alignment. Nondisplaced ulnar styloid base fracture. ASSESSMENT/PLAN:  Below is the assessment and plan developed based on review of pertinent history, physical exam, labs, studies, and medications. Patient fell and injured her left wrist washing her car when she fell off a 2 foot platform ladder on 7/17/2022. She sustained a grade 1 open left distal radius Colles' type fracture. The wound was treated in the [facility] ER with irrigation, dressing application. The fracture was treated as well in the ER with a closed manipulative reduction and splint application. He also sustained a contusion to the tip of the right ring finger with some ecchymosis and tenderness but x-rays show no evidence of a fracture. I reviewed the treatment options with the patient and answered her questions.   I recommended prompt surgical management with planned irrigation and debridement of the puncture wound and volar fixed angle plate fixation of the distal radius fracture. She underwent irrigation and debridement ORIF of the grade 1 open left distal radius fracture on 7/19/2022. Her wounds are healing well there is really no redness drainage or sign of infection. She may work with slow restoration of motion than strength. I recommended a splint for comfort and support and follow-up in 2 to 3 weeks. She will return to work on 8/8/2022.    1. Type I or II open Colles' fracture of left radius with routine healing, subsequent encounter  -     WRIST COCK-UP NON-MOLDED  -     REFERRAL TO DME  -     XR WRIST LT AP/LAT/OBL MIN 3V; Future  -     REFERRAL TO PHYSICAL THERAPY  2. Left wrist pain      No follow-ups on file. An electronic signature was used to authenticate this note.   -- Kaye Hyde MD

## 2022-07-22 ENCOUNTER — OFFICE VISIT (OUTPATIENT)
Dept: ORTHOPEDIC SURGERY | Age: 52
End: 2022-07-22
Payer: COMMERCIAL

## 2022-07-22 DIAGNOSIS — S52.532E TYPE I OR II OPEN COLLES' FRACTURE OF LEFT RADIUS WITH ROUTINE HEALING, SUBSEQUENT ENCOUNTER: Primary | ICD-10-CM

## 2022-07-22 DIAGNOSIS — M25.532 LEFT WRIST PAIN: ICD-10-CM

## 2022-07-22 PROCEDURE — L3908 WHO COCK-UP NONMOLDE PRE OTS: HCPCS | Performed by: ORTHOPAEDIC SURGERY

## 2022-07-22 PROCEDURE — 99024 POSTOP FOLLOW-UP VISIT: CPT | Performed by: ORTHOPAEDIC SURGERY

## 2022-07-22 NOTE — PATIENT INSTRUCTIONS
Wrist Fracture: Rehab Exercises  Introduction  Here are some examples of exercises for you to try. The exercises may be suggested for a condition or for rehabilitation. Start each exercise slowly. Ease off the exercises if you start to have pain. You will be told when to start these exercises and which ones will work best for you. How to do the exercises  Wrist flexion and extension    Place your forearm on a table, with your hand and affected wrist extended beyond the table, palm down. Bend your wrist to move your hand upward and allow your hand to close into a fist, then lower your hand and allow your fingers to relax. Hold each position for about 6 seconds. Repeat 8 to 12 times. Hand flips    While seated, place your forearm and affected wrist on your thigh, palm down. Flip your hand over so the back of your hand rests on your thigh and your palm is up. Alternate between palm up and palm down while keeping your forearm on your thigh. Repeat 8 to 12 times. Wrist radial and ulnar deviation    Hold your affected hand out in front of you, palm down. Slowly bend your wrist as far as you can from side to side. Hold each position for about 6 seconds. Repeat 8 to 12 times. Wrist extensor stretch    Extend the arm with the affected wrist in front of you and point your fingers toward the floor. With your other hand, gently bend your wrist farther until you feel a mild to moderate stretch in your forearm. Hold the stretch for at least 15 to 30 seconds. Repeat 2 to 4 times. When you can do this stretch with ease and no pain, repeat steps 1 through 4. But this time extend your affected arm in front of you and make a fist with your palm facing down. Then bend your wrist, pointing your fist toward the floor. Wrist flexor stretch    Extend the arm with the affected wrist in front of you with your palm facing away from your body. Bend back your wrist, pointing your hand up toward the ceiling.   With your other hand, gently bend your wrist farther until you feel a mild to moderate stretch in your forearm. Hold the stretch for at least 15 to 30 seconds. Repeat 2 to 4 times. Repeat steps 1 through 5, but this time extend your affected arm in front of you with your palm facing up. Then bend back your wrist, pointing your hand toward the floor. Intrinsic flexion    Rest the hand with the affected wrist on a table and bend the large joints where your fingers connect to your hand. Keep your thumb and the other joints in your fingers straight. Slowly straighten your fingers. Your wrist should be relaxed, following the line of your fingers and thumb. Move back to your starting position, with your hand bent. Repeat 8 to 12 times. MP extension    Place your good hand on a table, palm up. Put the hand with the affected wrist on top of your good hand with your fingers wrapped around the thumb of your good hand like you are making a fist.  Slowly uncurl the joints of the hand with the affected wrist where your fingers connect to your hand so that only the top two joints of your fingers are bent. Your fingers will look like a hook. Hold the position for about 6 seconds. Move back to your starting position, with your fingers wrapped around your good thumb. Repeat 8 to 12 times. Follow-up care is a key part of your treatment and safety. Be sure to make and go to all appointments, and call your doctor if you are having problems. It's also a good idea to know your test results and keep a list of the medicines you take. Where can you learn more? Go to http://www.gray.com/  Enter Z454 in the search box to learn more about \"Wrist Fracture: Rehab Exercises. \"  Current as of: July 1, 2021               Content Version: 13.2  © 9259-4076 [employer]. Care instructions adapted under license by [employer] (which disclaims liability or warranty for this information).  If you have questions about a medical condition or this instruction, always ask your healthcare professional. [place] [address] any warranty or liability for your use of this information.

## 2022-07-29 ENCOUNTER — OFFICE VISIT (OUTPATIENT)
Dept: ORTHOPEDIC SURGERY | Age: 52
End: 2022-07-29
Payer: COMMERCIAL

## 2022-07-29 DIAGNOSIS — S52.532E TYPE I OR II OPEN COLLES' FRACTURE OF LEFT RADIUS WITH ROUTINE HEALING, SUBSEQUENT ENCOUNTER: Primary | ICD-10-CM

## 2022-07-29 DIAGNOSIS — M25.532 LEFT WRIST PAIN: ICD-10-CM

## 2022-07-29 PROCEDURE — 97161 PT EVAL LOW COMPLEX 20 MIN: CPT | Performed by: PHYSICAL THERAPIST

## 2022-07-29 PROCEDURE — 97140 MANUAL THERAPY 1/> REGIONS: CPT | Performed by: PHYSICAL THERAPIST

## 2022-07-29 PROCEDURE — 97110 THERAPEUTIC EXERCISES: CPT | Performed by: PHYSICAL THERAPIST

## 2022-07-29 NOTE — PROGRESS NOTES
Titi Perales (: 1970) is a 47 y.o. Wrist Pain       Patient Name: Titi Perales  Date:2022  : 1970  [x]  Patient  Verified  Payor: Clemente Salazar / Plan: Johnnie Jansen / Product Type: PPO /    Meir Cleaning MD  Total Treatment Time (min): 60  Total Timed Codes (min): 50  1:1 Treatment Time ( [address] only): N/A  Visit #:  30      Treatment Area: Left wrist    ASSESSMENT/PLAN:  Below is the assessment and plan developed based on review of pertinent history, physical exam, labs, studies, and medications. Patient comes in today status post left distal radial fracture with ORIF on 2022 and presents to physical therapy deficits including range of motion, mobility, flexibility, and strength. She will benefit from a physical therapy program to address above-mentioned deficits. Short-term goals: To become independent with today's prescribed home exercise program in 1 week. Long-term goals: To progress with a physical therapy program so the patient demonstrates functional left wrist and forearm range of motion and strength allowing her to transition back into work without wrist pain or limitation in the next 4 to 8 weeks. Patient will be seen twice a week for up to 20 visits  with focus on progressive restoration of range of motion and strength, balance, and functional mobility. Therapeutic applications will include but are not limited to:Manual therapy, joint mobilization, myofascial release, therapeutic exercises. Modalities including ultrasound and electric stimulation heat and ice. Kinesiotape and Mckeon taping for joint reeducation and approximation of tissue for neuromuscular reeducation. 1. Type I or II open Colles' fracture of left radius with routine healing, subsequent encounter  2. Left wrist pain    Return in about 3 days (around 2022). SUBJECTIVE:  HPI  She reports improvement since surgery.   She did have some discharge from her wound and was given a course of antibiotics. 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 She plans to return to work on August 8 if possible. He is taking Tylenol for pain. She has not been icing. Patient is right-hand dominant. She is complaining more of ulnar-sided wrist pain. She reports having a hairline fracture in her distal ulna as part of the injury. Please see patient's medical chart for detailed list of current medications as well as significant past medical history. OBJECTIVE:  Evaluation (20 minutes)  Patient comes in today demonstrating a rounded shoulder posture. Cervical range of motion is pain-free and within normal limits. She is neurologically intact throughout bilateral upper extremities. Left wrist and hand: Passive wrist flexion 50 degrees. Passive wrist extension 20 degrees. Passive forearm supination 15 degrees. Passive forearm pronation 70 degrees. Surgical incision is healing with no signs of infection. Patient has diminished  strength approximately 50% to contralateral side. Diminished ability to manipulate objects with her fingers. Flexibility restriction to both flexor and extensor compartment of her forearm. 4Minus/5 strength with resisted wrist flexion and extension. Tinel's sign at wrist.  Pulses and sensation intact throughout wrist and hand. QuickDASH-9: 60      Manual(mins 15)  Extensive wrist mobilization with passive range of motion into flexion, extension, and supination. Modalities(mins 10)  Is posttreatment    Exercise(mins 15)  Today's interventions we initiated exercises for wrist and hand strengthening, range of motion, and flexibility for patient perform at home on a daily basis. Today's exercises include stretch, wrist flexion and extension stretching, wrist active range of motion, putty , supination stretch, active supination and pronation. An electronic signature was used to authenticate this note.   -- Berlin Carrera, PT

## 2022-08-08 ENCOUNTER — OFFICE VISIT (OUTPATIENT)
Dept: ORTHOPEDIC SURGERY | Age: 52
End: 2022-08-08
Payer: COMMERCIAL

## 2022-08-08 DIAGNOSIS — M25.532 LEFT WRIST PAIN: ICD-10-CM

## 2022-08-08 DIAGNOSIS — S52.532E TYPE I OR II OPEN COLLES' FRACTURE OF LEFT RADIUS WITH ROUTINE HEALING, SUBSEQUENT ENCOUNTER: Primary | ICD-10-CM

## 2022-08-08 PROCEDURE — 97110 THERAPEUTIC EXERCISES: CPT | Performed by: PHYSICAL THERAPIST

## 2022-08-08 PROCEDURE — 97140 MANUAL THERAPY 1/> REGIONS: CPT | Performed by: PHYSICAL THERAPIST

## 2022-08-08 NOTE — PROGRESS NOTES
Renato Goncalves (: 1970) is a 47 y.o. Wrist Pain       Patient Name: Renato Goncalves  Date:2022  : 1970  [x]  Patient  Verified  Payor: Precious Kern / Plan: Franky Parcel / Product Type: PPO /    Leo Ford MD  Total Treatment Time (min): 60  Total Timed Codes (min): 40  1:1 Treatment Time ( only): N/A  Visit #: 2 of 30      Treatment Area: Left wrist    ASSESSMENT/PLAN:  Below is the assessment and plan developed based on review of pertinent history, physical exam, labs, studies, and medications. Improving with wrist strength and functional range of motion. Continue with current plan of care and progress towards long-term goals. 1. Type I or II open Colles' fracture of left radius with routine healing, subsequent encounter  2. Left wrist pain    Return in about 2 days (around 8/10/2022). SUBJECTIVE:  HPI  Patient return to work today and did well. She feels as though her motion is improving. OBJECTIVE:  50 degrees extension  55 degrees flexion  40 degrees supination        Manual(mins 15)  Extensive wrist mobilization with passive range of motion into flexion, extension, and supination. Modalities(mins 20)  Ice posttreatment  Ultrasound 2 dorsal wrist at 2.0 MHz and 50% duty cycle      Exercise(mins 25)   Today's exercises include prayer stretch, wrist flexion and extension stretching, dumbbell walk, ball wrist flexion extension, wrist active range of motion, putty , supination stretch, active supination and pronation, 1 pound resisted flexion/extension/radial deviation. An electronic signature was used to authenticate this note.   -- Cathie Castillo, PT

## 2022-08-10 ENCOUNTER — OFFICE VISIT (OUTPATIENT)
Dept: ORTHOPEDIC SURGERY | Age: 52
End: 2022-08-10
Payer: COMMERCIAL

## 2022-08-10 DIAGNOSIS — M25.532 LEFT WRIST PAIN: ICD-10-CM

## 2022-08-10 DIAGNOSIS — S52.532E TYPE I OR II OPEN COLLES' FRACTURE OF LEFT RADIUS WITH ROUTINE HEALING, SUBSEQUENT ENCOUNTER: Primary | ICD-10-CM

## 2022-08-10 PROCEDURE — 97140 MANUAL THERAPY 1/> REGIONS: CPT | Performed by: PHYSICAL THERAPIST

## 2022-08-10 PROCEDURE — 97035 APP MDLTY 1+ULTRASOUND EA 15: CPT | Performed by: PHYSICAL THERAPIST

## 2022-08-10 PROCEDURE — 97110 THERAPEUTIC EXERCISES: CPT | Performed by: PHYSICAL THERAPIST

## 2022-08-11 NOTE — PROGRESS NOTES
Quin Martino (: 1970) is a 47 y.o. Wrist Pain       Patient Name: Quin Martino  : 1970  [x]  Patient  Verified  Payor: Luisa Sabina / Plan: Clair Rogel / Product Type: PPO /    Ivan Muñoz MD  Total Treatment Time (min): 65  Total Timed Codes (min): 53  1:1 Treatment Time ([address] only): N/A  Visit #: 3  30      Treatment Area: Left wrist    ASSESSMENT/PLAN:  Below is the assessment and plan developed based on review of pertinent history, physical exam, labs, studies, and medications. Better with wrist extension. Primary restriction is with wrist flexion. Progressing with wrist and hand strength exercise program.  Continue with current plan of care and progress towards long-term goals. 1. Type I or II open Colles' fracture of left radius with routine healing, subsequent encounter  2. Left wrist pain    Return in about 4 days (around 2022). SUBJECTIVE:  HPI  Patient return to work today and did well. She feels as though her motion is improving. OBJECTIVE:  70 degrees extension  55 degrees flexion   60 degrees supination        Manual(mins 15)  Extensive wrist mobilization with passive range of motion into flexion, extension, and supination. Modalities  Ice posttreatment      Ultrasound: dorsal wrist at 2.0 MHz and 50% duty cycle(8 mins)      Exercise(mins 30)   Today's exercises include prayer stretch, UBE, wrist flexion and extension stretching, dumbbell walk, ball wrist flexion extension, wrist active range of motion, putty , supination stretch, active supination and pronation, 1 pound resisted flexion/extension/radial deviation. An electronic signature was used to authenticate this note.   -- Helga Rojas, PT

## 2022-08-12 NOTE — PROGRESS NOTES
HPI: Stephanie Deng (: 1970) is a 47 y.o. female, patient, here for evaluation of the following chief complaint(s):    No chief complaint on file. Patient is seen today to evaluate her left wrist.  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 She was taken to [address] [place] [address]. She had a small puncture wound and a displaced distal radius Colles' type fracture. She was treated with prompt closed reduction of the fracture with irrigation and dressing application of the open wound. This was followed by splint application. She has been placed on oral antibiotic therapy. She denied any problems previously with the left wrist.  She underwent irrigation and debridement ORIF of the open left distal radius fracture volar fixed angle plating on 2022. She has a non-displaced ulnar styloid base fracture treated closed with no DRUJ instability. Vitals: There were no vitals taken for this visit. There is no height or weight on file to calculate BMI. Allergies   Allergen Reactions    Percocet [Oxycodone-Acetaminophen] Nausea and Vomiting    Percodan [Oxycodone Hcl-Oxycodone-Asa] Nausea and Vomiting    Sulfa (Sulfonamide Antibiotics) Hives       Current Outpatient Medications   Medication Sig    fluticasone (FLONASE) 50 mcg/actuation nasal spray 2 Sprays by Both Nostrils route daily. naproxen sodium 220 mg cap Take 220 mg by mouth as needed. cetirizine (ZYRTEC) 10 mg tablet Take 10 mg by mouth as needed for Allergies. levonorgestrel (MIRENA) 20 mcg/24 hour (5 years) IUD 1 Each by IntraUTERine route once. No current facility-administered medications for this visit.        Past Medical History:   Diagnosis Date    Heart murmur     CONGENITAL    Hypercholesterolemia     Nausea & vomiting     S/P colonoscopy ,2020- hyperplastic rectal polyp        Past Surgical History:   Procedure Laterality Date    COLONOSCOPY N/A 12/3/2020    . COLONOSCOPY   :- performed by Kitty Gaines MD at [facility] ENDOSCOPY    HX BREAST BIOPSY Left     age 38; BENIGN    HX HEENT      ear tubes x 4    HX ORTHOPAEDIC  12/2015    RT FINGER REPAIR FROM DOG BITE    HX OTHER SURGICAL      Urethral sling for bladder incontinence    IA BREAST SURGERY PROCEDURE UNLISTED  2005    LEFT BREAST BIOPSY       Family History   Problem Relation Age of Onset    Cancer Mother         small cell lung cancer    Other Mother         Graves disease    Hypertension Mother     Coronary Art Dis Father         stents    High Cholesterol Father     Heart Surgery Father 39        CABG x3    Hypertension Father     Depression Father     Breast Cancer Paternal Aunt     Anesth Problems Neg Hx         Social History     Tobacco Use    Smoking status: Never    Smokeless tobacco: Never   Substance Use Topics    Alcohol use: Yes     Alcohol/week: 5.0 standard drinks     Types: 5 Glasses of wine per week    Drug use: No        Review of Systems   All other systems reviewed and are negative. Physical Exam    First overall now has good early motion of about 40 degrees flexion extension and near complete forearm rotation. She does have some discomfort to the ulnar side of the wrist mildly. Imaging:    XR Results (maximum last 3): Results from Appointment encounter on 07/22/22    XR WRIST LT AP/LAT/OBL MIN 3V    Narrative  AP, lateral and oblique x-ray of the left wrist shows a healing extra-articular distal radius fracture with volar fixed angle plate and screw fixation good position and alignment. Nondisplaced ulnar styloid base fracture. Results from Appointment encounter on 07/18/22    XR 4TH FINGER RT MIN 2 V    Narrative  AP, lateral and oblique x-ray of the right ring finger shows normal osseous and joint space findings no evidence of fracture arthritis.       Results from [place] encounter on 07/17/22    XR WRIST LT AP/LAT/OBL MIN 3V    Narrative  EXAM: XR WRIST LT AP/LAT/OBL MIN 3V    INDICATION: post-reduction. COMPARISON: Earlier today. FINDINGS: 2 views of the left wrist demonstrate reduction of the distal radial  fracture in near anatomic alignment. .    Impression  Near anatomic alignment of the distal radial fracture after  reduction. .        XR Results (most recent):  Results from Appointment encounter on 08/15/22    XR WRIST LT AP/LAT/OBL MIN 3V    Narrative  AP, lateral and oblique x-ray of the left wrist shows good interval healing of the transverse metaphyseal extra-articular left distal radius fracture with volar fixed angle plate and screw fixation. There is also nondisplaced ulnar styloid base fracture barely visible. Good alignment. ASSESSMENT/PLAN:  Below is the assessment and plan developed based on review of pertinent history, physical exam, labs, studies, and medications. Patient fell and injured her left wrist washing her car when she fell off a 2 foot platform ladder on 7/17/2022. She sustained a grade 1 open left distal radius Colles' type fracture. The wound was treated in the [facility] ER with irrigation, dressing application. The fracture was treated as well in the ER with a closed manipulative reduction and splint application. He also sustained a contusion to the tip of the right ring finger with some ecchymosis and tenderness but x-rays show no evidence of a fracture. I reviewed the treatment options with the patient and answered her questions. I recommended prompt surgical management with planned irrigation and debridement of the puncture wound and volar fixed angle plate fixation of the distal radius fracture. She underwent irrigation and debridement ORIF of the grade 1 open left distal radius fracture on 7/19/2022. Her wounds are healing well there is really no redness drainage or sign of infection.   She may work with slow restoration of motion than strength. I recommended a splint for comfort and support and follow-up in 2 to 3 weeks. She will return to work on 8/8/2022. She does admit to some ulnar-sided wrist pain that hopefully will resolve over time related likely to the nondisplaced ulnar styloid base fracture. I plan to see her back in 4 to 6 weeks to check her progress sooner if needed. 1. Type I or II open Colles' fracture of left radius with routine healing, subsequent encounter  2. Left wrist pain      No follow-ups on file. An electronic signature was used to authenticate this note.   -- Jose R Cole MD

## 2022-08-15 ENCOUNTER — OFFICE VISIT (OUTPATIENT)
Dept: ORTHOPEDIC SURGERY | Age: 52
End: 2022-08-15
Payer: COMMERCIAL

## 2022-08-15 DIAGNOSIS — S52.532E TYPE I OR II OPEN COLLES' FRACTURE OF LEFT RADIUS WITH ROUTINE HEALING, SUBSEQUENT ENCOUNTER: Primary | ICD-10-CM

## 2022-08-15 DIAGNOSIS — M25.532 LEFT WRIST PAIN: ICD-10-CM

## 2022-08-15 PROCEDURE — 99024 POSTOP FOLLOW-UP VISIT: CPT | Performed by: ORTHOPAEDIC SURGERY

## 2022-08-15 NOTE — PATIENT INSTRUCTIONS
Wrist Fracture: Rehab Exercises  Introduction  Here are some examples of exercises for you to try. The exercises may be suggested for a condition or for rehabilitation. Start each exercise slowly. Ease off the exercises if you start to have pain. You will be told when to start these exercises and which ones will work best for you. How to do the exercises  Wrist flexion and extension    Place your forearm on a table, with your hand and affected wrist extended beyond the table, palm down. Bend your wrist to move your hand upward and allow your hand to close into a fist, then lower your hand and allow your fingers to relax. Hold each position for about 6 seconds. Repeat 8 to 12 times. Hand flips    While seated, place your forearm and affected wrist on your thigh, palm down. Flip your hand over so the back of your hand rests on your thigh and your palm is up. Alternate between palm up and palm down while keeping your forearm on your thigh. Repeat 8 to 12 times. Wrist radial and ulnar deviation    Hold your affected hand out in front of you, palm down. Slowly bend your wrist as far as you can from side to side. Hold each position for about 6 seconds. Repeat 8 to 12 times. Wrist extensor stretch    Extend the arm with the affected wrist in front of you and point your fingers toward the floor. With your other hand, gently bend your wrist farther until you feel a mild to moderate stretch in your forearm. Hold the stretch for at least 15 to 30 seconds. Repeat 2 to 4 times. When you can do this stretch with ease and no pain, repeat steps 1 through 4. But this time extend your affected arm in front of you and make a fist with your palm facing down. Then bend your wrist, pointing your fist toward the floor. Wrist flexor stretch    Extend the arm with the affected wrist in front of you with your palm facing away from your body. Bend back your wrist, pointing your hand up toward the ceiling.   With your other hand, gently bend your wrist farther until you feel a mild to moderate stretch in your forearm. Hold the stretch for at least 15 to 30 seconds. Repeat 2 to 4 times. Repeat steps 1 through 5, but this time extend your affected arm in front of you with your palm facing up. Then bend back your wrist, pointing your hand toward the floor. Intrinsic flexion    Rest the hand with the affected wrist on a table and bend the large joints where your fingers connect to your hand. Keep your thumb and the other joints in your fingers straight. Slowly straighten your fingers. Your wrist should be relaxed, following the line of your fingers and thumb. Move back to your starting position, with your hand bent. Repeat 8 to 12 times. MP extension    Place your good hand on a table, palm up. Put the hand with the affected wrist on top of your good hand with your fingers wrapped around the thumb of your good hand like you are making a fist.  Slowly uncurl the joints of the hand with the affected wrist where your fingers connect to your hand so that only the top two joints of your fingers are bent. Your fingers will look like a hook. Hold the position for about 6 seconds. Move back to your starting position, with your fingers wrapped around your good thumb. Repeat 8 to 12 times. Follow-up care is a key part of your treatment and safety. Be sure to make and go to all appointments, and call your doctor if you are having problems. It's also a good idea to know your test results and keep a list of the medicines you take. Where can you learn more? Go to http://mario-haider.info/  Enter Z454 in the search box to learn more about \"Wrist Fracture: Rehab Exercises. \"  Current as of: July 1, 2021               Content Version: 13.2  © 3050-3989 [employer]. Care instructions adapted under license by [employer] (which disclaims liability or warranty for this information).  If you have questions about a medical condition or this instruction, always ask your healthcare professional. [place] [address] any warranty or liability for your use of this information.

## 2022-08-16 ENCOUNTER — OFFICE VISIT (OUTPATIENT)
Dept: ORTHOPEDIC SURGERY | Age: 52
End: 2022-08-16
Payer: COMMERCIAL

## 2022-08-16 DIAGNOSIS — S52.532E TYPE I OR II OPEN COLLES' FRACTURE OF LEFT RADIUS WITH ROUTINE HEALING, SUBSEQUENT ENCOUNTER: Primary | ICD-10-CM

## 2022-08-16 DIAGNOSIS — M25.532 LEFT WRIST PAIN: ICD-10-CM

## 2022-08-16 PROCEDURE — 97140 MANUAL THERAPY 1/> REGIONS: CPT | Performed by: PHYSICAL THERAPIST

## 2022-08-16 PROCEDURE — 97110 THERAPEUTIC EXERCISES: CPT | Performed by: PHYSICAL THERAPIST

## 2022-08-16 PROCEDURE — 97035 APP MDLTY 1+ULTRASOUND EA 15: CPT | Performed by: PHYSICAL THERAPIST

## 2022-08-16 NOTE — PROGRESS NOTES
Kaleb Gay (: 1970) is a 47 y.o. Wrist Pain       Patient Name: Kaleb Gay  : 1970  [x]  Patient  Verified  Payor: Roopa Esqueda / Plan: Claudene Betters / Product Type: PPO /    Lovely Herbert MD  Total Treatment Time (min): 65  Total Timed Codes (min): 53  1:1 Treatment Time ([address] only): N/A  Visit #: 4  30      Treatment Area: Left wrist    ASSESSMENT/PLAN:  Below is the assessment and plan developed based on review of pertinent history, physical exam, labs, studies, and medications. Hypersensitivity and mild scar adhesion around scar line. Functional forearm supination and pronation. Continues to lack wrist flexion. Progressing with wrist and hand strength exercise program.  Continue with current plan of care and progress towards long-term goals. 1. Type I or II open Colles' fracture of left radius with routine healing, subsequent encounter  2. Left wrist pain    Return in about 2 days (around 2022). SUBJECTIVE:  HPI  Patient return to work today and did well. She feels as though her motion is improving. OBJECTIVE:  75 degrees extension  60 degrees flexion   70 degrees supination        Manual(mins 15)  Extensive wrist mobilization with passive range of motion into flexion, extension, and supination. Modalities  Ice posttreatment      Ultrasound: dorsal wrist at 2.0 MHz and 50% duty cycle(8 mins)      Exercise(mins 30)   Today's exercises include prayer stretch, UBE, wrist flexion and extension stretching, dumbbell walk, ball wrist flexion extension, wrist active range of motion, putty , supination stretch, active supination and pronation, 1 pound resisted flexion/extension/radial deviation. An electronic signature was used to authenticate this note.   -- Moises Pearson, PT

## 2022-08-18 ENCOUNTER — OFFICE VISIT (OUTPATIENT)
Dept: ORTHOPEDIC SURGERY | Age: 52
End: 2022-08-18
Payer: COMMERCIAL

## 2022-08-18 DIAGNOSIS — S52.532E TYPE I OR II OPEN COLLES' FRACTURE OF LEFT RADIUS WITH ROUTINE HEALING, SUBSEQUENT ENCOUNTER: Primary | ICD-10-CM

## 2022-08-18 DIAGNOSIS — M25.532 LEFT WRIST PAIN: ICD-10-CM

## 2022-08-18 PROCEDURE — 97110 THERAPEUTIC EXERCISES: CPT | Performed by: PHYSICAL THERAPIST

## 2022-08-18 PROCEDURE — 97140 MANUAL THERAPY 1/> REGIONS: CPT | Performed by: PHYSICAL THERAPIST

## 2022-08-18 PROCEDURE — 97035 APP MDLTY 1+ULTRASOUND EA 15: CPT | Performed by: PHYSICAL THERAPIST

## 2022-08-18 NOTE — PROGRESS NOTES
Renato Howe (: 1970) is a 47 y.o. Wrist Pain       Patient Name: Renato Howe  : 1970  [x]  Patient  Verified  Payor: Zia Lemus / Plan: Ericka Carranza / Product Type: PPO /    Geri Schwartz MD  Total Treatment Time (min): 65  Total Timed Codes (min): 53  1:1 Treatment Time ([facility] only): N/A  Visit #:  30      Treatment Area: Left wrist    ASSESSMENT/PLAN:  Still has some hypersensitivity around cervical scar line. Improved today after ultrasound. I would expect the Kinesiotape to also help with sensitivity. Still has some endrange restriction into both flexion and extension. Pronation is approaching contralateral side. Continue with current plan of care and progress towards long-term goals. 1. Type I or II open Colles' fracture of left radius with routine healing, subsequent encounter  2. Left wrist pain    Return in about 4 days (around 2022). SUBJECTIVE:  HPI  Pt has felt more stiff and achy. OBJECTIVE:  75 degrees extension  60 degrees flexion   80 degrees supination        Manual(mins 15)  Extensive wrist mobilization with passive range of motion into flexion, extension, and supination. K tape over surgical scar line with lengthening into forearm. Modalities  Ice posttreatment      Ultrasound: volar wrist over scar at 2.0 MHz and 50% duty cycle(8 mins)      Exercise(mins 30)   Today's exercises include prayer stretch, UBE, wrist flexion and extension stretching, dumbbell walk, ball wrist flexion extension, wrist active range of motion, putty , supination stretch, active supination and pronation, 1 pound resisted flexion/extension/radial deviation. An electronic signature was used to authenticate this note.   -- Malu Olson, PT

## 2022-08-22 ENCOUNTER — OFFICE VISIT (OUTPATIENT)
Dept: ORTHOPEDIC SURGERY | Age: 52
End: 2022-08-22
Payer: COMMERCIAL

## 2022-08-22 DIAGNOSIS — S52.532E TYPE I OR II OPEN COLLES' FRACTURE OF LEFT RADIUS WITH ROUTINE HEALING, SUBSEQUENT ENCOUNTER: Primary | ICD-10-CM

## 2022-08-22 DIAGNOSIS — M25.532 LEFT WRIST PAIN: ICD-10-CM

## 2022-08-22 PROCEDURE — 97110 THERAPEUTIC EXERCISES: CPT

## 2022-08-22 PROCEDURE — 97140 MANUAL THERAPY 1/> REGIONS: CPT

## 2022-08-22 PROCEDURE — 97035 APP MDLTY 1+ULTRASOUND EA 15: CPT

## 2022-08-22 NOTE — PROGRESS NOTES
Magdalene Whitmore (: 1970) is a 47 y.o. Wrist Pain       Patient Name: Magdalene Whitmore  : 1970  [x]  Patient  Verified  Payor: Tor Davis / Plan: Pedro Dai / Product Type: PPO /    Lisandra Ramirez MD  Total Treatment Time (min): 65  Total Timed Codes (min): 53  1:1 Treatment Time ( [address] only): N/A  Visit #: 5 of 30      Treatment Area: Left wrist    ASSESSMENT/PLAN:    Patient with improved supination and pronation today after manual stretching to thenar eminence and extensors. She continues to have pain on her ulna syloid process that is referring up her lateral forearm. K-tape is helping her with performing daily activities. Still has some endrange restriction into both flexion and extension. Continue with current plan of care and progress towards long-term goals. 1. Type I or II open Colles' fracture of left radius with routine healing, subsequent encounter  2. Left wrist pain    Return in about 2 days (around 2022) for Continue therapy for wrist.    SUBJECTIVE:  HPI  Patient stated that her posterior wrist is sore and that she would like to try some K-tape on the lateral side of her forearm. OBJECTIVE:        Manual(mins 8)  Extensive wrist mobilization with passive range of motion into flexion, extension, and supination. STM to thenar eminence and wrist extensors. Modalities (10 min)   Ice posttreatment    K tape over styloid process with lengthening into extensors. Ultrasound: volar wrist over scar at 2.0 MHz and 50% duty cycle(8 mins)      Exercise(mins 45)   Today's exercises include prayer stretch, UBE, wrist flexion and extension stretching, dumbbell abduction/ flexion, ball supination/pronation on re-bounder, extension, wrist active range of motion, putty , supination stretch, active supination and pronation with massage stick, 1 pound resisted flexion/extension, banded radial deviation.       -- Co-treated by MEAGHAN Villar

## 2022-08-25 ENCOUNTER — OFFICE VISIT (OUTPATIENT)
Dept: ORTHOPEDIC SURGERY | Age: 52
End: 2022-08-25
Payer: COMMERCIAL

## 2022-08-25 DIAGNOSIS — S52.532E TYPE I OR II OPEN COLLES' FRACTURE OF LEFT RADIUS WITH ROUTINE HEALING, SUBSEQUENT ENCOUNTER: Primary | ICD-10-CM

## 2022-08-25 DIAGNOSIS — M25.532 LEFT WRIST PAIN: ICD-10-CM

## 2022-08-25 PROCEDURE — 97140 MANUAL THERAPY 1/> REGIONS: CPT | Performed by: PHYSICAL THERAPIST

## 2022-08-25 PROCEDURE — 97110 THERAPEUTIC EXERCISES: CPT | Performed by: PHYSICAL THERAPIST

## 2022-08-25 NOTE — PROGRESS NOTES
Froilan Demarco (: 1970) is a 47 y.o. Wrist Pain       Patient Name: Froilan Demarco  : 1970  [x]  Patient  Verified  Payor: Victorina Fees / Plan: Blaire Aguilar / Product Type: PPO /    Jian Pollock MD  Total Treatment Time (min): 70  Total Timed Codes (min): 60  1:1 Treatment Time ( [address] only): N/A  Visit #: 6 of 30      Treatment Area: Left wrist    ASSESSMENT/PLAN:    Patient's range of motion is approaching functional limits. Primary limit is flexion. Continue with current plan of care and progress towards long-term goals. 1. Type I or II open Colles' fracture of left radius with routine healing, subsequent encounter  2. Left wrist pain    Return in about 4 days (around 2022). SUBJECTIVE:  HPI  Wrist is improving day-to-day. She notices her supination is better. OBJECTIVE:  Passive supination -15 degrees to contralateral side. Passive flexion -30 degrees to contralateral side      Manual(mins 15)  Extensive wrist mobilization with passive range of motion into flexion, extension, and supination. Cross friction massage to radius scar line. Myofascial release and mobilization to radial ulnar interosseous. Exercise(mins 45)   Today's exercises include prayer stretch, UBE, wrist flexion and extension stretching, dumbbell abduction/ flexion, ball supination/pronation on re-bounder, extension, wrist active range of motion, putty , supination stretch, active supination and pronation with massage stick, 1 pound resisted flexion/extension, banded radial deviation.       -- Co-treated by MEAGHAN Rosenberg

## 2022-09-01 ENCOUNTER — OFFICE VISIT (OUTPATIENT)
Dept: ORTHOPEDIC SURGERY | Age: 52
End: 2022-09-01
Payer: COMMERCIAL

## 2022-09-01 DIAGNOSIS — M25.532 LEFT WRIST PAIN: ICD-10-CM

## 2022-09-01 DIAGNOSIS — S52.532E TYPE I OR II OPEN COLLES' FRACTURE OF LEFT RADIUS WITH ROUTINE HEALING, SUBSEQUENT ENCOUNTER: Primary | ICD-10-CM

## 2022-09-01 PROCEDURE — 97110 THERAPEUTIC EXERCISES: CPT

## 2022-09-01 PROCEDURE — 97140 MANUAL THERAPY 1/> REGIONS: CPT

## 2022-09-01 NOTE — PROGRESS NOTES
Christiana Mark (: 1970) is a 47 y.o. Hand Pain       Patient Name: Christiana Mark  : 1970  [x]  Patient  Verified  Payor: Jimmy Cody / Plan: Shanae Thurston / Product Type: PPO /    Kane Red MD  Total Treatment Time (min): 70  Total Timed Codes (min): 60  1:1 Treatment Time ([address] only): N/A  Visit #: 7  30      Treatment Area: Left wrist    ASSESSMENT/PLAN:    1. Type I or II open Colles' fracture of left radius with routine healing, subsequent encounter  2. Left wrist pain    Return in about 5 days (around 2022) for continue therapy for wrist.    Patient with increased strength lifting 3 lb weight into extension, radial deviation, and flexion with no increase in pain. Continues to have decreased supination. We are working on progressing this with weighted stretching, patient tolerated this well. Tenderness to touch along posterior carpal bones. SUBJECTIVE:  HPI     Patient reports that she fell today at work and landed on her wrist, her fingers were extended out when she landed. Stated that she does feel some muscle soreness from this. OBJECTIVE:  ROM:  supination    Function/ Strength:     Manual(mins 15)  Wrist mobilizations into extension, flexion, radial and ulnar deviation. 1st metacarpal traction and mobilizations. STM/ myofacial release to forearm flexors and extensors. Exercise(mins 45)  Today's exercises include weaved extension stretch, wrist flexion and extension stretching, dumbbell abduction/ flexion, Supination stretch with weighted wand, wrist active range of motion, putty push and roll out, supination stretch, active supination and pronation with massage stick, 3 pound resisted flexion/extension/radial deviation.     Ice post treatement (10 Min)    -- Co-treated by MEAGHAN Remy

## 2022-09-08 ENCOUNTER — OFFICE VISIT (OUTPATIENT)
Dept: ORTHOPEDIC SURGERY | Age: 52
End: 2022-09-08
Payer: COMMERCIAL

## 2022-09-08 DIAGNOSIS — S52.532E TYPE I OR II OPEN COLLES' FRACTURE OF LEFT RADIUS WITH ROUTINE HEALING, SUBSEQUENT ENCOUNTER: Primary | ICD-10-CM

## 2022-09-08 DIAGNOSIS — M25.532 LEFT WRIST PAIN: ICD-10-CM

## 2022-09-08 PROCEDURE — 97140 MANUAL THERAPY 1/> REGIONS: CPT

## 2022-09-08 PROCEDURE — 97110 THERAPEUTIC EXERCISES: CPT

## 2022-09-08 NOTE — PROGRESS NOTES
Brenda Villegas (: 1970) is a 47 y.o. Wrist Pain       Patient Name: Brenda Villegas  : 1970  [x]  Patient  Verified  Payor: Aki العراقي / Plan: Wendy Duffy / Product Type: PPO /    Charlette Montana MD  Total Treatment Time (min): 70  Total Timed Codes (min): 60  1:1 Treatment Time ( [address] only): N/A  Visit #:  30      Treatment Area: Left wrist    ASSESSMENT/PLAN:    1. Type I or II open Colles' fracture of left radius with routine healing, subsequent encounter  2. Left wrist pain    Return in about 5 days (around 2022) for continue therapy for wrist .    Patient is making steady progress with improved  strength. Continues to have decreased supination compared to contralateral side and pain on distal ulna. K-tape has been helping to decreased her pain with daily activities. Continue to increase strength and weightbearing as tolerated. SUBJECTIVE:  HPI     Stated that she is having a dull aching feeling still on her ulna styloid process. Reports that she still has times where her pinky finger has a blank feeling, like it just doesn't want to work properly at times. Pain from her fall last week has subsided. 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 5th metacarpal traction and mobilizations. Distal radioulnar joint mobilization. Exercise(mins 45)  Today's exercises include weaved extension stretch, wrist flexion and extension stretching, dumbbell abduction/ flexion, Supination stretch with weighted wand, wrist active range of motion, quadruped weight shifts , supination stretch, active supination and pronation with massage stick, 3 pound resisted, farmer carry, flexion/extension/radial deviation.     Ice post treatement (10 Min)    -- Co-treated by Olga Lidia Castaneda ANA

## 2022-09-22 ENCOUNTER — OFFICE VISIT (OUTPATIENT)
Dept: ORTHOPEDIC SURGERY | Age: 52
End: 2022-09-22
Payer: COMMERCIAL

## 2022-09-22 DIAGNOSIS — S52.532E TYPE I OR II OPEN COLLES' FRACTURE OF LEFT RADIUS WITH ROUTINE HEALING, SUBSEQUENT ENCOUNTER: Primary | ICD-10-CM

## 2022-09-22 DIAGNOSIS — M25.532 LEFT WRIST PAIN: ICD-10-CM

## 2022-09-22 PROCEDURE — 97110 THERAPEUTIC EXERCISES: CPT

## 2022-09-22 PROCEDURE — 97140 MANUAL THERAPY 1/> REGIONS: CPT

## 2022-09-23 NOTE — PROGRESS NOTES
Yakov Carney (: 1970) is a 47 y.o. Shoulder Pain       Patient Name: Yakov Carney  : 1970  [x]  Patient  Verified  Payor: Tyree John / Plan: Rowdy Hartman / Product Type: PPO /    Mani Felipe MD  Total Treatment Time (min): 70  Total Timed Codes (min): 60  1:1 Treatment Time ( [address] only): N/A  Visit #: 10 of 30      Treatment Area: Left wrist    ASSESSMENT/PLAN:    1. Type I or II open Colles' fracture of left radius with routine healing, subsequent encounter  2. Left wrist pain    Return in about 6 days (around 2022) for continue therapy for wrist.    Patient was able to tolerate full extension weight shifts against the wall this session. Decreased pain on styloid process of ulna and increased supination and pronation noted. She continues to have decreased  strength in order to lift household items but we are seeing improved  strength each session. We have updated her HEP in order to improve this. We will continue to work on forearm strength and full supination. SUBJECTIVE:  HPI     Stated that medial forearm pain has decreased significantly. She is seeing improvement with supination and pronation. Still have trouble gripping heavier items such as a jug of milk. OBJECTIVE:  ROM: Will measure next session    Function/ Strength: wall weight shifts with no pain    Manual(mins 15)  Wrist mobilizations into extension, flexion, radial and ulnar deviation. 5th metacarpal traction and mobilizations. Distal radioulnar joint mobilization. Exercise(mins 45)  Today's exercises include Supination/ Pronation with weight, wrist ext/ flex weight, radial/ ulnar deviation weight, table extension stretch, wall weight shifts, farmer carry, UBE.     Ice post treatement (10 Min)    -- Co-treated by MEAGHAN Walter

## 2022-09-29 NOTE — PROGRESS NOTES
HPI: Magdalene Whitmore (: 1970) is a 47 y.o. female, patient, here for evaluation of the following chief complaint(s):    No chief complaint on file. Patient is seen today to evaluate her left wrist.  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 She was taken to [facility] ER. She had a small puncture wound and a displaced distal radius Colles' type fracture. She was treated with prompt closed reduction of the fracture with irrigation and dressing application of the open wound. This was followed by splint application. She has been placed on oral antibiotic therapy. She denied any problems previously with the left wrist.  She underwent irrigation and debridement ORIF of the open left distal radius fracture volar fixed angle plating on 2022. She also had a non-displaced ulnar styloid base fracture treated closed with no DRUJ instability. Vitals: There were no vitals taken for this visit. There is no height or weight on file to calculate BMI. Allergies   Allergen Reactions    Percocet [Oxycodone-Acetaminophen] Nausea and Vomiting    Percodan [Oxycodone Hcl-Oxycodone-Asa] Nausea and Vomiting    Sulfa (Sulfonamide Antibiotics) Hives       Current Outpatient Medications   Medication Sig    naproxen sodium 220 mg cap Take 220 mg by mouth as needed. cetirizine (ZYRTEC) 10 mg tablet Take 10 mg by mouth as needed for Allergies. No current facility-administered medications for this visit. Past Medical History:   Diagnosis Date    Heart murmur     CONGENITAL    Hypercholesterolemia     Nausea & vomiting     S/P colonoscopy 2010- hyperplastic rectal polyp        Past Surgical History:   Procedure Laterality Date    COLONOSCOPY N/A 12/3/2020    . COLONOSCOPY   :- performed by Janell Lau MD at [facility] ENDOSCOPY    HX BREAST BIOPSY Left     age 38; BENIGN    HX HEENT      ear tubes x 4    HX ORTHOPAEDIC  12/2015    RT FINGER REPAIR FROM DOG BITE    HX OTHER SURGICAL      Urethral sling for bladder incontinence    UT BREAST SURGERY PROCEDURE UNLISTED  2005    LEFT BREAST BIOPSY       Family History   Problem Relation Age of Onset    Cancer Mother         small cell lung cancer    Other Mother         Graves disease    Hypertension Mother     Coronary Art Dis Father         stents    High Cholesterol Father     Heart Surgery Father 39        CABG x3    Hypertension Father     Depression Father     Breast Cancer Paternal Aunt     Anesth Problems Neg Hx         Social History     Tobacco Use    Smoking status: Never    Smokeless tobacco: Never   Substance Use Topics    Alcohol use: Yes     Alcohol/week: 5.0 standard drinks     Types: 5 Glasses of wine per week    Drug use: No        Review of Systems   All other systems reviewed and are negative. Physical Exam    Overall the patient demonstrated excellent range of motion of the forearm wrist and hand. She is able to extend and flex the wrist virtually 7 degrees and denied any significant ulnar-sided wrist pain. Imaging:    XR Results (maximum last 3): Results from Appointment encounter on 09/30/22    XR WRIST LT AP/LAT/OBL MIN 3V    Narrative  AP, lateral oblique x-ray of the left wrist shows continued interval healing of the transverse metaphyseal extra-articular left distal radius fracture treated with volar fixed angle plate and screw fixation. There is apparent interval healing when compared to radiographs from 8/15/2022. Results from Appointment encounter on 08/15/22    XR WRIST LT AP/LAT/OBL MIN 3V    Narrative  AP, lateral and oblique x-ray of the left wrist shows good interval healing of the transverse metaphyseal extra-articular left distal radius fracture with volar fixed angle plate and screw fixation. There is also nondisplaced ulnar styloid base fracture barely visible.   Good alignment. Results from Appointment encounter on 07/22/22    XR WRIST LT AP/LAT/OBL MIN 3V    Narrative  AP, lateral and oblique x-ray of the left wrist shows a healing extra-articular distal radius fracture with volar fixed angle plate and screw fixation good position and alignment. Nondisplaced ulnar styloid base fracture. ASSESSMENT/PLAN:  Below is the assessment and plan developed based on review of pertinent history, physical exam, labs, studies, and medications. Patient fell and injured her left wrist washing her car when she fell off a 2 foot platform ladder on 7/17/2022. She sustained a grade 1 open left distal radius Colles' type fracture. The wound was treated in the [facility] ER with irrigation, dressing application. The fracture was treated as well in the ER with a closed manipulative reduction and splint application. He also sustained a contusion to the tip of the right ring finger with some ecchymosis and tenderness but x-rays show no evidence of a fracture. I reviewed the treatment options with the patient and answered her questions. I recommended prompt surgical management with planned irrigation and debridement of the puncture wound and volar fixed angle plate fixation of the distal radius fracture. She underwent irrigation and debridement ORIF of the grade 1 open left distal radius fracture on 7/19/2022. Her wounds are healing well there is really no redness drainage or sign of infection. She may work with slow restoration of motion than strength. I recommended a splint for comfort and support and follow-up in 2 to 3 weeks. She will return to work on 8/8/2022. The patient no longer has any ulnar-sided wrist pain and was thought to be related to a nondisplaced ulnar styloid fracture now not even seen on new radiographs. She may continue with forearm wrist and hand motion and strength to her tolerance. She has completed formal outpatient therapy.   She may continue with home exercises for strengthening and return in 6 to 8 weeks or anytime for further treatment. 1. Type I or II open Colles' fracture of left radius with routine healing, subsequent encounter  -     XR WRIST LT AP/LAT/OBL MIN 3V; Future  2. Left wrist pain      Return if symptoms worsen or fail to improve. An electronic signature was used to authenticate this note.   -- Allyn Esquivel MD

## 2022-09-30 ENCOUNTER — OFFICE VISIT (OUTPATIENT)
Dept: ORTHOPEDIC SURGERY | Age: 52
End: 2022-09-30
Payer: COMMERCIAL

## 2022-09-30 ENCOUNTER — OFFICE VISIT (OUTPATIENT)
Dept: ORTHOPEDIC SURGERY | Age: 52
End: 2022-09-30

## 2022-09-30 DIAGNOSIS — S52.532E TYPE I OR II OPEN COLLES' FRACTURE OF LEFT RADIUS WITH ROUTINE HEALING, SUBSEQUENT ENCOUNTER: Primary | ICD-10-CM

## 2022-09-30 DIAGNOSIS — M25.532 LEFT WRIST PAIN: ICD-10-CM

## 2022-09-30 PROCEDURE — 97110 THERAPEUTIC EXERCISES: CPT

## 2022-09-30 PROCEDURE — 99024 POSTOP FOLLOW-UP VISIT: CPT | Performed by: ORTHOPAEDIC SURGERY

## 2022-09-30 NOTE — PROGRESS NOTES
Radha Mederos (: 1970) is a 47 y.o. Wrist Pain       Patient Name: Radha Mederos  : 1970  [x]  Patient  Verified  Payor: Merline Grow / Plan: Valentine Deng / Product Type: PPO /    Patel Galloway MD  Total Treatment Time (min): 45  Total Timed Codes (min): 45  1:1 Treatment Time ([facility] only): N/A  Visit #:       Treatment Area: Left wrist    ASSESSMENT/PLAN:    1. Type I or II open Colles' fracture of left radius with routine healing, subsequent encounter  2. Left wrist pain    Return if symptoms worsen or fail to improve. Patient is 10 weeks out from distal radius colles type fracture and is doing well overall. She has full wrist range of motion and was able to perform push ups, modified on her knees, this session with no pain. 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 Mild keloid noted on scar, patient has been attending to decreasing this at home. She will transition to an independent program at this time and I have provided her with exercises to perform in order to increase strength with a focus on  strength independently.      SUBJECTIVE:  HPI     Stated     OBJECTIVE:  ROM: Right- Full  Left -Flexion- 70, Extension 75, Radial 20, Ulnar 30    Function/ Strength: slightly decreased  strength than contralateral side    Manual- None      Exercise(mins 45)    Bicep Curls 6# Hammer  Forearm Ext/ Flex Curls 4#  Ball drops/ Catch  Supination/ Pronation ball drop on re bounder  Putty  strength - 3 finger , 5 finger squeeze  Modified push up 5x, on knees  Wrist extension with 5 pound weight  BB        -- Co-treated by MEAGHAN Everett

## 2022-09-30 NOTE — PATIENT INSTRUCTIONS
Wrist Fracture: Rehab Exercises  Introduction  Here are some examples of exercises for you to try. The exercises may be suggested for a condition or for rehabilitation. Start each exercise slowly. Ease off the exercises if you start to have pain. You will be told when to start these exercises and which ones will work best for you. How to do the exercises  Wrist flexion and extension    Place your forearm on a table, with your hand and affected wrist extended beyond the table, palm down. Bend your wrist to move your hand upward and allow your hand to close into a fist, then lower your hand and allow your fingers to relax. Hold each position for about 6 seconds. Repeat 8 to 12 times. Hand flips    While seated, place your forearm and affected wrist on your thigh, palm down. Flip your hand over so the back of your hand rests on your thigh and your palm is up. Alternate between palm up and palm down while keeping your forearm on your thigh. Repeat 8 to 12 times. Wrist radial and ulnar deviation    Hold your affected hand out in front of you, palm down. Slowly bend your wrist as far as you can from side to side. Hold each position for about 6 seconds. Repeat 8 to 12 times. Wrist extensor stretch    Extend the arm with the affected wrist in front of you and point your fingers toward the floor. With your other hand, gently bend your wrist farther until you feel a mild to moderate stretch in your forearm. Hold the stretch for at least 15 to 30 seconds. Repeat 2 to 4 times. When you can do this stretch with ease and no pain, repeat steps 1 through 4. But this time extend your affected arm in front of you and make a fist with your palm facing down. Then bend your wrist, pointing your fist toward the floor. Wrist flexor stretch    Extend the arm with the affected wrist in front of you with your palm facing away from your body. Bend back your wrist, pointing your hand up toward the ceiling.   With your other hand, gently bend your wrist farther until you feel a mild to moderate stretch in your forearm. Hold the stretch for at least 15 to 30 seconds. Repeat 2 to 4 times. Repeat steps 1 through 5, but this time extend your affected arm in front of you with your palm facing up. Then bend back your wrist, pointing your hand toward the floor. Intrinsic flexion    Rest the hand with the affected wrist on a table and bend the large joints where your fingers connect to your hand. Keep your thumb and the other joints in your fingers straight. Slowly straighten your fingers. Your wrist should be relaxed, following the line of your fingers and thumb. Move back to your starting position, with your hand bent. Repeat 8 to 12 times. MP extension    Place your good hand on a table, palm up. Put the hand with the affected wrist on top of your good hand with your fingers wrapped around the thumb of your good hand like you are making a fist.  Slowly uncurl the joints of the hand with the affected wrist where your fingers connect to your hand so that only the top two joints of your fingers are bent. Your fingers will look like a hook. Hold the position for about 6 seconds. Move back to your starting position, with your fingers wrapped around your good thumb. Repeat 8 to 12 times. Follow-up care is a key part of your treatment and safety. Be sure to make and go to all appointments, and call your doctor if you are having problems. It's also a good idea to know your test results and keep a list of the medicines you take. Where can you learn more? Go to http://www.gray.com/  Enter Z454 in the search box to learn more about \"Wrist Fracture: Rehab Exercises. \"  Current as of: July 1, 2021               Content Version: 13.2  © 8393-6612 [employer]. Care instructions adapted under license by [employer] (which disclaims liability or warranty for this information).  If you have questions about a medical condition or this instruction, always ask your healthcare professional. [place] [address] any warranty or liability for your use of this information.

## 2023-01-06 ENCOUNTER — TRANSCRIBE ORDER (OUTPATIENT)
Dept: GENERAL RADIOLOGY | Age: 53
End: 2023-01-06

## 2023-01-06 DIAGNOSIS — Z12.31 OTHER SCREENING MAMMOGRAM: Primary | ICD-10-CM

## 2023-01-18 ENCOUNTER — HOSPITAL ENCOUNTER (OUTPATIENT)
Dept: MAMMOGRAPHY | Age: 53
Discharge: HOME | End: 2023-01-18
Attending: OBSTETRICS & GYNECOLOGY
Payer: COMMERCIAL

## 2023-01-18 DIAGNOSIS — Z12.31 OTHER SCREENING MAMMOGRAM: ICD-10-CM

## 2023-01-18 PROCEDURE — 77063 BREAST TOMOSYNTHESIS BI: CPT

## 2024-03-06 ENCOUNTER — TRANSCRIBE ORDERS (OUTPATIENT)
Facility: HOSPITAL | Age: 54
End: 2024-03-06

## 2024-03-06 DIAGNOSIS — Z12.31 VISIT FOR SCREENING MAMMOGRAM: Primary | ICD-10-CM

## 2024-03-13 ENCOUNTER — HOSPITAL ENCOUNTER (OUTPATIENT)
Facility: HOSPITAL | Age: 54
Discharge: HOME | End: 2024-03-16
Attending: OBSTETRICS & GYNECOLOGY
Payer: COMMERCIAL

## 2024-03-13 DIAGNOSIS — Z12.31 VISIT FOR SCREENING MAMMOGRAM: ICD-10-CM

## 2024-03-13 PROCEDURE — 77063 BREAST TOMOSYNTHESIS BI: CPT
